# Patient Record
Sex: FEMALE | Race: WHITE
[De-identification: names, ages, dates, MRNs, and addresses within clinical notes are randomized per-mention and may not be internally consistent; named-entity substitution may affect disease eponyms.]

---

## 2017-09-03 ENCOUNTER — HEALTH MAINTENANCE LETTER (OUTPATIENT)
Age: 57
End: 2017-09-03

## 2018-10-10 ENCOUNTER — HOSPITAL ENCOUNTER (OUTPATIENT)
Age: 58
Discharge: HOME | End: 2018-10-10
Attending: FAMILY MEDICINE
Payer: COMMERCIAL

## 2018-10-10 ENCOUNTER — OFFICE VISIT (OUTPATIENT)
Dept: OBGYN CLINIC | Facility: CLINIC | Age: 58
End: 2018-10-10
Payer: COMMERCIAL

## 2018-10-10 VITALS
TEMPERATURE: 99 F | HEIGHT: 64 IN | HEART RATE: 115 BPM | SYSTOLIC BLOOD PRESSURE: 170 MMHG | BODY MASS INDEX: 35 KG/M2 | RESPIRATION RATE: 22 BRPM | DIASTOLIC BLOOD PRESSURE: 79 MMHG | OXYGEN SATURATION: 100 % | WEIGHT: 205 LBS

## 2018-10-10 VITALS
WEIGHT: 205 LBS | BODY MASS INDEX: 35 KG/M2 | HEIGHT: 64 IN | SYSTOLIC BLOOD PRESSURE: 140 MMHG | DIASTOLIC BLOOD PRESSURE: 88 MMHG

## 2018-10-10 DIAGNOSIS — N95.0 POSTMENOPAUSAL VAGINAL BLEEDING: Primary | ICD-10-CM

## 2018-10-10 DIAGNOSIS — N95.0 POSTMENOPAUSAL BLEEDING: Primary | ICD-10-CM

## 2018-10-10 DIAGNOSIS — Z12.39 SCREENING BREAST EXAMINATION: ICD-10-CM

## 2018-10-10 PROCEDURE — 99204 OFFICE O/P NEW MOD 45 MIN: CPT | Performed by: OBSTETRICS & GYNECOLOGY

## 2018-10-10 PROCEDURE — 99202 OFFICE O/P NEW SF 15 MIN: CPT

## 2018-10-10 PROCEDURE — 88305 TISSUE EXAM BY PATHOLOGIST: CPT | Performed by: OBSTETRICS & GYNECOLOGY

## 2018-10-10 RX ORDER — LEVOTHYROXINE SODIUM 0.05 MG/1
50 TABLET ORAL
COMMUNITY
End: 2018-10-29

## 2018-10-10 RX ORDER — QUINAPRIL 20 MG/1
20 TABLET ORAL NIGHTLY
COMMUNITY
End: 2018-10-25

## 2018-10-10 NOTE — PROGRESS NOTES
NEW GYN H&P     10/10/2018  3:42 PM    CC: Patient is here to establish care    HPI: Patient is a 62year old  LMP  referred from [address] [place] [address] for evaluation of acute onset heavy bright red vaginal bleeding.  Last gyne visit and PAP , reporting no gyne History      Marital status: Single      Spouse name: Not on file      Number of children: Not on file      Years of education: Not on file      Highest education level: Not on file    Social Needs      Financial resource strain: Not on file      Food inse Genitalia: bright red blood on perineum, no lesions or lacerations  Urethra: meatus normal   Bladder: well supported  Vagina: blood in vault, no lesions seen  Uterus:unable to palpate due to habitus  Cervix: blood at os  Adnexa: unable to palpate due to ha

## 2018-10-10 NOTE — ED PROVIDER NOTES
Patient Seen in: [address]    History   Patient presents with:  Vaginal Bleeding    Stated Complaint: Vaginal Bleeding     HPI    63 yo with 1 day h/o of vaginal bleeding.  States bleeding as though she was having a rocio and oriented to person, place, and time. Skin: Skin is warm and dry. Capillary refill takes less than 2 seconds. Psychiatric: She has a normal mood and affect. Her behavior is normal.   Nursing note and vitals reviewed.             ED Course   Labs Revi

## 2018-10-10 NOTE — ED NOTES
Pt discharged to gynecology department at [address] [place] [address] for same-day appointment. Avs reviewed and given to patient. Patient given list of names for PCP provider. Pt verbalized understanding and agreed.

## 2018-10-10 NOTE — ED INITIAL ASSESSMENT (HPI)
Pt here today for vaginal bleeding noted this morning. She reports 7 years since last menstrual cycle. She states she moved to [facility] from [place] about 1 year ago and has not established care with pcp or gyn.  Denied pelvic pain, pelvic pressure, low ba

## 2018-10-11 ENCOUNTER — ULTRASOUND ENCOUNTER (OUTPATIENT)
Dept: OBGYN CLINIC | Facility: CLINIC | Age: 58
End: 2018-10-11
Payer: COMMERCIAL

## 2018-10-11 DIAGNOSIS — N95.0 POSTMENOPAUSAL BLEEDING: ICD-10-CM

## 2018-10-11 PROCEDURE — 76830 TRANSVAGINAL US NON-OB: CPT | Performed by: OBSTETRICS & GYNECOLOGY

## 2018-10-11 PROCEDURE — 76856 US EXAM PELVIC COMPLETE: CPT | Performed by: OBSTETRICS & GYNECOLOGY

## 2018-10-15 ENCOUNTER — TELEPHONE (OUTPATIENT)
Dept: OBGYN CLINIC | Facility: CLINIC | Age: 58
End: 2018-10-15

## 2018-10-15 NOTE — TELEPHONE ENCOUNTER
Informed pt that her path report results have not been released yet by Dr. Joy Vazquez. Let pt know that once reviewed and released, pt will see it on her MyChart or receive a call. Voiced understanding.

## 2018-10-15 NOTE — TELEPHONE ENCOUNTER
Informed patient of benign EMB results showing proliferative EM. Recommend D&C to remove residual lining. Will call and schedule pre-op visit to plan surgery date.

## 2018-10-15 NOTE — TELEPHONE ENCOUNTER
Per Dr. Blossom Rendon, pts path report is WNL and she will contact her with the next steps. Let pt know.

## 2018-10-18 ENCOUNTER — TELEPHONE (OUTPATIENT)
Dept: OBGYN CLINIC | Facility: CLINIC | Age: 58
End: 2018-10-18

## 2018-10-18 ENCOUNTER — OFFICE VISIT (OUTPATIENT)
Dept: OBGYN CLINIC | Facility: CLINIC | Age: 58
End: 2018-10-18
Payer: COMMERCIAL

## 2018-10-18 VITALS
DIASTOLIC BLOOD PRESSURE: 88 MMHG | SYSTOLIC BLOOD PRESSURE: 142 MMHG | WEIGHT: 212.31 LBS | BODY MASS INDEX: 36.25 KG/M2 | HEIGHT: 64 IN

## 2018-10-18 DIAGNOSIS — E66.9 OBESITY, CLASS II, BMI 35-39.9: ICD-10-CM

## 2018-10-18 DIAGNOSIS — N95.0 POSTMENOPAUSAL BLEEDING: Primary | ICD-10-CM

## 2018-10-18 DIAGNOSIS — Z01.818 VISIT FOR PRE-OPERATIVE EXAMINATION: ICD-10-CM

## 2018-10-18 PROBLEM — E66.812 OBESITY, CLASS II, BMI 35-39.9: Status: ACTIVE | Noted: 2018-10-18

## 2018-10-18 PROCEDURE — 99214 OFFICE O/P EST MOD 30 MIN: CPT | Performed by: OBSTETRICS & GYNECOLOGY

## 2018-10-18 RX ORDER — MISOPROSTOL 200 UG/1
200 TABLET ORAL
Qty: 1 TABLET | Refills: 0 | Status: SHIPPED | OUTPATIENT
Start: 2018-10-18 | End: 2018-10-19

## 2018-10-18 RX ORDER — MISOPROSTOL 200 UG/1
200 TABLET ORAL
Qty: 1 TABLET | Refills: 0 | Status: SHIPPED | OUTPATIENT
Start: 2018-10-18 | End: 2018-10-18

## 2018-10-18 RX ORDER — LORAZEPAM 1 MG/1
1 TABLET ORAL NIGHTLY
Qty: 1 TABLET | Refills: 0 | Status: SHIPPED | OUTPATIENT
Start: 2018-10-18 | End: 2018-10-19

## 2018-10-18 NOTE — TELEPHONE ENCOUNTER
Pt states [facility] will not fill 1 tablet for Cytotec allowed only 60 pills. Per [facility] send to a different pharmacy other then [facility] to see if they can fill 1 tablet.  Pt asking to have sent to [facility] in [place] located in [address]

## 2018-10-18 NOTE — TELEPHONE ENCOUNTER
Pt having a difficult time [facility] pharmacy please cancel rx for lorazepam and send over to [facility] in [address]. Pt asking if we can remove [facility] out of her chart.

## 2018-10-18 NOTE — H&P (VIEW-ONLY)
[address], [facility]    History & Physical    Stevie Lott Patient Status:  No patient class for patient encounter    1960 MRN [id number]   Location [address] [place] frequency  Hematological/lymphatic: no complaints  Breast: denies rashes, skin changes, pain, lumps or discharge   Psychiatric: no complaints  Endocrine:no complaints  Neurological: no complaints  Immunological: no complaints  Musculoskeletal:no complaints

## 2018-10-18 NOTE — H&P
[address], [facility]    History & Physical    Reji Chang [facility] Patient Status:  No patient class for patient encounter    1960 MRN [id number]   Location [address] [place] frequency  Hematological/lymphatic: no complaints  Breast: denies rashes, skin changes, pain, lumps or discharge   Psychiatric: no complaints  Endocrine:no complaints  Neurological: no complaints  Immunological: no complaints  Musculoskeletal:no complaints

## 2018-10-25 ENCOUNTER — OFFICE VISIT (OUTPATIENT)
Dept: FAMILY MEDICINE CLINIC | Facility: CLINIC | Age: 58
End: 2018-10-25
Payer: COMMERCIAL

## 2018-10-25 VITALS
OXYGEN SATURATION: 98 % | HEART RATE: 109 BPM | DIASTOLIC BLOOD PRESSURE: 88 MMHG | HEIGHT: 64 IN | WEIGHT: 213 LBS | SYSTOLIC BLOOD PRESSURE: 138 MMHG | BODY MASS INDEX: 36.37 KG/M2

## 2018-10-25 DIAGNOSIS — Z23 NEED FOR VACCINATION: ICD-10-CM

## 2018-10-25 DIAGNOSIS — Z11.59 ENCOUNTER FOR HEPATITIS C SCREENING TEST FOR LOW RISK PATIENT: ICD-10-CM

## 2018-10-25 DIAGNOSIS — E03.9 HYPOTHYROIDISM, UNSPECIFIED TYPE: ICD-10-CM

## 2018-10-25 DIAGNOSIS — I10 ESSENTIAL HYPERTENSION: ICD-10-CM

## 2018-10-25 DIAGNOSIS — Z00.01 ENCOUNTER FOR ROUTINE ADULT HEALTH EXAMINATION WITH ABNORMAL FINDINGS: Primary | ICD-10-CM

## 2018-10-25 DIAGNOSIS — E11.9 TYPE 2 DIABETES MELLITUS WITHOUT COMPLICATION, WITHOUT LONG-TERM CURRENT USE OF INSULIN (HCC): ICD-10-CM

## 2018-10-25 PROBLEM — R73.03 PREDIABETES: Status: ACTIVE | Noted: 2018-10-25

## 2018-10-25 PROCEDURE — 90471 IMMUNIZATION ADMIN: CPT | Performed by: FAMILY MEDICINE

## 2018-10-25 PROCEDURE — 99386 PREV VISIT NEW AGE 40-64: CPT | Performed by: FAMILY MEDICINE

## 2018-10-25 PROCEDURE — 90686 IIV4 VACC NO PRSV 0.5 ML IM: CPT | Performed by: FAMILY MEDICINE

## 2018-10-25 RX ORDER — QUINAPRIL 20 MG/1
20 TABLET ORAL NIGHTLY
Qty: 90 TABLET | Refills: 1 | Status: SHIPPED | OUTPATIENT
Start: 2018-10-25 | End: 2019-04-16

## 2018-10-25 NOTE — PROGRESS NOTES
HPI:   Cher Reynolds is a 62year old female who presents for a complete physical exam.   Patient presents with:  Establish Care  Physical  Medication Request    Diagnosed with diabetes or pre-diabetes a few years ago, but states her A1C has never been abov possible reaction   Past Medical History:   Diagnosis Date   • Diabetes [facility])    • Essential hypertension    • History of PCOS    • Post-menopausal bleeding    • Post-menopause 2011   • Thyroid disease     hypothyroidism      Past Surgical History:   Pro examination with abnormal findings  (primary encounter diagnosis)  Hypothyroidism, unspecified type  Type 2 diabetes mellitus without complication, without long-term current use of insulin (hcc)  Essential hypertension  Encounter for hepatitis c screening Checked today      All questions were answered during the visit and the patient verbalizes understanding. She will return in 3-6 months for follow-up of HTN and DM, one year for next [address] or sooner as needed.     Meds & Refills for this Visit:  Requested

## 2018-10-25 NOTE — PATIENT INSTRUCTIONS
-Start Vitamin D3 2,000 IU daily, and await results of blood work   Prevention Guidelines, Women Ages 48 to 59  Screening tests and vaccines are an important part of managing your health.  A screening test is done to find possible disorders or diseases in Cervical cancer All women in this age group, except women who have had a complete hysterectomy Pap test every 3 years or Pap test with human papillomavirus (HPV) test every 5 years   Chlamydia Women at increased risk for infection At routine exams   Colore Hepatitis B Women at increased risk for infection – talk with your healthcare provider 3 doses over 6 months; second dose should be given 1 month after the first dose; the third dose should be given at least 2 months after the second dose and at least 4 mo Use of daily aspirin Women ages 54 and up in this age group who are at risk for cardiovascular health problems such as stroke When your risk is known   Use of tobacco and the health effects it can cause All women in this age group Every exam   [address]

## 2018-10-27 ENCOUNTER — LAB ENCOUNTER (OUTPATIENT)
Dept: LAB | Facility: REFERENCE LAB | Age: 58
End: 2018-10-27
Attending: FAMILY MEDICINE
Payer: COMMERCIAL

## 2018-10-27 DIAGNOSIS — Z11.59 ENCOUNTER FOR HEPATITIS C SCREENING TEST FOR LOW RISK PATIENT: ICD-10-CM

## 2018-10-27 DIAGNOSIS — E11.9 TYPE 2 DIABETES MELLITUS WITHOUT COMPLICATION, WITHOUT LONG-TERM CURRENT USE OF INSULIN (HCC): ICD-10-CM

## 2018-10-27 DIAGNOSIS — E03.9 HYPOTHYROIDISM, UNSPECIFIED TYPE: ICD-10-CM

## 2018-10-27 DIAGNOSIS — Z00.01 ENCOUNTER FOR ROUTINE ADULT HEALTH EXAMINATION WITH ABNORMAL FINDINGS: ICD-10-CM

## 2018-10-27 PROCEDURE — 82570 ASSAY OF URINE CREATININE: CPT

## 2018-10-27 PROCEDURE — 83036 HEMOGLOBIN GLYCOSYLATED A1C: CPT

## 2018-10-27 PROCEDURE — 85025 COMPLETE CBC W/AUTO DIFF WBC: CPT

## 2018-10-27 PROCEDURE — 80061 LIPID PANEL: CPT

## 2018-10-27 PROCEDURE — 82043 UR ALBUMIN QUANTITATIVE: CPT

## 2018-10-27 PROCEDURE — 80053 COMPREHEN METABOLIC PANEL: CPT

## 2018-10-27 PROCEDURE — 36415 COLL VENOUS BLD VENIPUNCTURE: CPT

## 2018-10-27 PROCEDURE — 84443 ASSAY THYROID STIM HORMONE: CPT

## 2018-10-27 PROCEDURE — 86803 HEPATITIS C AB TEST: CPT

## 2018-10-29 RX ORDER — LEVOTHYROXINE SODIUM 0.05 MG/1
50 TABLET ORAL
Qty: 90 TABLET | Refills: 3 | Status: SHIPPED | OUTPATIENT
Start: 2018-10-29 | End: 2019-10-22

## 2018-10-31 RX ORDER — ESOMEPRAZOLE MAGNESIUM 20 MG/1
20 FOR SUSPENSION ORAL
COMMUNITY
End: 2020-01-15 | Stop reason: ALTCHOICE

## 2018-10-31 RX ORDER — MULTIVIT-MIN/IRON/FOLIC ACID/K 18-600-40
1 CAPSULE ORAL DAILY
COMMUNITY

## 2018-11-02 ENCOUNTER — TELEPHONE (OUTPATIENT)
Dept: OBGYN CLINIC | Facility: CLINIC | Age: 58
End: 2018-11-02

## 2018-11-02 NOTE — TELEPHONE ENCOUNTER
Needs to know when she should be taking her medication prescribed prior to sugery. Okay to leave message on voicemail.

## 2018-11-02 NOTE — TELEPHONE ENCOUNTER
Left VM for pt stating if pt was referring to Cytotec, it needs to be taken night before surgery and to call with any additional questions

## 2018-11-06 ENCOUNTER — ANESTHESIA EVENT (OUTPATIENT)
Dept: SURGERY | Facility: HOSPITAL | Age: 58
End: 2018-11-06
Payer: COMMERCIAL

## 2018-11-06 ENCOUNTER — HOSPITAL ENCOUNTER (OUTPATIENT)
Facility: HOSPITAL | Age: 58
Setting detail: HOSPITAL OUTPATIENT SURGERY
Discharge: HOME | End: 2018-11-06
Attending: OBSTETRICS & GYNECOLOGY | Admitting: OBSTETRICS & GYNECOLOGY
Payer: COMMERCIAL

## 2018-11-06 ENCOUNTER — ANESTHESIA (OUTPATIENT)
Dept: SURGERY | Facility: HOSPITAL | Age: 58
End: 2018-11-06
Payer: COMMERCIAL

## 2018-11-06 VITALS
SYSTOLIC BLOOD PRESSURE: 122 MMHG | RESPIRATION RATE: 18 BRPM | DIASTOLIC BLOOD PRESSURE: 73 MMHG | HEIGHT: 63 IN | BODY MASS INDEX: 36.86 KG/M2 | HEART RATE: 78 BPM | TEMPERATURE: 98 F | WEIGHT: 208 LBS | OXYGEN SATURATION: 97 %

## 2018-11-06 PROCEDURE — 0UDB8ZX EXTRACTION OF ENDOMETRIUM, VIA NATURAL OR ARTIFICIAL OPENING ENDOSCOPIC, DIAGNOSTIC: ICD-10-PCS | Performed by: OBSTETRICS & GYNECOLOGY

## 2018-11-06 PROCEDURE — 58558 HYSTEROSCOPY BIOPSY: CPT | Performed by: OBSTETRICS & GYNECOLOGY

## 2018-11-06 RX ORDER — ONDANSETRON 2 MG/ML
4 INJECTION INTRAMUSCULAR; INTRAVENOUS EVERY 8 HOURS PRN
Status: DISCONTINUED | OUTPATIENT
Start: 2018-11-06 | End: 2018-11-06

## 2018-11-06 RX ORDER — LIDOCAINE HYDROCHLORIDE 10 MG/ML
INJECTION, SOLUTION EPIDURAL; INFILTRATION; INTRACAUDAL; PERINEURAL AS NEEDED
Status: DISCONTINUED | OUTPATIENT
Start: 2018-11-06 | End: 2018-11-06 | Stop reason: SURG

## 2018-11-06 RX ORDER — ONDANSETRON 2 MG/ML
4 INJECTION INTRAMUSCULAR; INTRAVENOUS ONCE AS NEEDED
Status: DISCONTINUED | OUTPATIENT
Start: 2018-11-06 | End: 2018-11-06

## 2018-11-06 RX ORDER — HYDROCODONE BITARTRATE AND ACETAMINOPHEN 5; 325 MG/1; MG/1
1 TABLET ORAL AS NEEDED
Status: DISCONTINUED | OUTPATIENT
Start: 2018-11-06 | End: 2018-11-06

## 2018-11-06 RX ORDER — HYDROCODONE BITARTRATE AND ACETAMINOPHEN 5; 325 MG/1; MG/1
2 TABLET ORAL AS NEEDED
Status: DISCONTINUED | OUTPATIENT
Start: 2018-11-06 | End: 2018-11-06

## 2018-11-06 RX ORDER — MIDAZOLAM HYDROCHLORIDE 1 MG/ML
INJECTION INTRAMUSCULAR; INTRAVENOUS AS NEEDED
Status: DISCONTINUED | OUTPATIENT
Start: 2018-11-06 | End: 2018-11-06 | Stop reason: SURG

## 2018-11-06 RX ORDER — ONDANSETRON 2 MG/ML
INJECTION INTRAMUSCULAR; INTRAVENOUS AS NEEDED
Status: DISCONTINUED | OUTPATIENT
Start: 2018-11-06 | End: 2018-11-06 | Stop reason: SURG

## 2018-11-06 RX ORDER — SODIUM CHLORIDE, SODIUM LACTATE, POTASSIUM CHLORIDE, CALCIUM CHLORIDE 600; 310; 30; 20 MG/100ML; MG/100ML; MG/100ML; MG/100ML
INJECTION, SOLUTION INTRAVENOUS CONTINUOUS
Status: DISCONTINUED | OUTPATIENT
Start: 2018-11-06 | End: 2018-11-06

## 2018-11-06 RX ORDER — MORPHINE SULFATE 10 MG/ML
6 INJECTION, SOLUTION INTRAMUSCULAR; INTRAVENOUS EVERY 10 MIN PRN
Status: DISCONTINUED | OUTPATIENT
Start: 2018-11-06 | End: 2018-11-06

## 2018-11-06 RX ORDER — FAMOTIDINE 20 MG/1
20 TABLET ORAL ONCE
Status: DISCONTINUED | OUTPATIENT
Start: 2018-11-06 | End: 2018-11-06 | Stop reason: HOSPADM

## 2018-11-06 RX ORDER — KETOROLAC TROMETHAMINE 30 MG/ML
INJECTION, SOLUTION INTRAMUSCULAR; INTRAVENOUS AS NEEDED
Status: DISCONTINUED | OUTPATIENT
Start: 2018-11-06 | End: 2018-11-06 | Stop reason: SURG

## 2018-11-06 RX ORDER — MORPHINE SULFATE 4 MG/ML
2 INJECTION, SOLUTION INTRAMUSCULAR; INTRAVENOUS EVERY 10 MIN PRN
Status: DISCONTINUED | OUTPATIENT
Start: 2018-11-06 | End: 2018-11-06

## 2018-11-06 RX ORDER — NITROFURANTOIN 25; 75 MG/1; MG/1
100 CAPSULE ORAL 2 TIMES DAILY
Status: DISCONTINUED | OUTPATIENT
Start: 2018-11-06 | End: 2018-11-06

## 2018-11-06 RX ORDER — DEXAMETHASONE SODIUM PHOSPHATE 4 MG/ML
VIAL (ML) INJECTION AS NEEDED
Status: DISCONTINUED | OUTPATIENT
Start: 2018-11-06 | End: 2018-11-06 | Stop reason: SURG

## 2018-11-06 RX ORDER — ACETAMINOPHEN 500 MG
1000 TABLET ORAL ONCE
Status: COMPLETED | OUTPATIENT
Start: 2018-11-06 | End: 2018-11-06

## 2018-11-06 RX ORDER — LORAZEPAM 1 MG/1
1 TABLET ORAL ONCE
COMMUNITY
End: 2018-11-16 | Stop reason: ALTCHOICE

## 2018-11-06 RX ORDER — DEXTROSE MONOHYDRATE 25 G/50ML
50 INJECTION, SOLUTION INTRAVENOUS
Status: DISCONTINUED | OUTPATIENT
Start: 2018-11-06 | End: 2018-11-06

## 2018-11-06 RX ORDER — ONDANSETRON 4 MG/1
4 TABLET, FILM COATED ORAL EVERY 8 HOURS PRN
Status: DISCONTINUED | OUTPATIENT
Start: 2018-11-06 | End: 2018-11-06

## 2018-11-06 RX ORDER — NALOXONE HYDROCHLORIDE 0.4 MG/ML
80 INJECTION, SOLUTION INTRAMUSCULAR; INTRAVENOUS; SUBCUTANEOUS AS NEEDED
Status: DISCONTINUED | OUTPATIENT
Start: 2018-11-06 | End: 2018-11-06

## 2018-11-06 RX ORDER — METOCLOPRAMIDE 10 MG/1
10 TABLET ORAL ONCE
Status: COMPLETED | OUTPATIENT
Start: 2018-11-06 | End: 2018-11-06

## 2018-11-06 RX ORDER — MORPHINE SULFATE 4 MG/ML
4 INJECTION, SOLUTION INTRAMUSCULAR; INTRAVENOUS EVERY 10 MIN PRN
Status: DISCONTINUED | OUTPATIENT
Start: 2018-11-06 | End: 2018-11-06

## 2018-11-06 RX ORDER — MISOPROSTOL 200 UG/1
200 TABLET ORAL ONCE
COMMUNITY
End: 2018-11-16 | Stop reason: ALTCHOICE

## 2018-11-06 RX ADMIN — KETOROLAC TROMETHAMINE 30 MG: 30 INJECTION, SOLUTION INTRAMUSCULAR; INTRAVENOUS at 08:10:00

## 2018-11-06 RX ADMIN — LIDOCAINE HYDROCHLORIDE 50 MG: 10 INJECTION, SOLUTION EPIDURAL; INFILTRATION; INTRACAUDAL; PERINEURAL at 07:50:00

## 2018-11-06 RX ADMIN — SODIUM CHLORIDE, SODIUM LACTATE, POTASSIUM CHLORIDE, CALCIUM CHLORIDE: 600; 310; 30; 20 INJECTION, SOLUTION INTRAVENOUS at 08:26:00

## 2018-11-06 RX ADMIN — ONDANSETRON 4 MG: 2 INJECTION INTRAMUSCULAR; INTRAVENOUS at 08:10:00

## 2018-11-06 RX ADMIN — MIDAZOLAM HYDROCHLORIDE 2 MG: 1 INJECTION INTRAMUSCULAR; INTRAVENOUS at 07:44:00

## 2018-11-06 RX ADMIN — SODIUM CHLORIDE, SODIUM LACTATE, POTASSIUM CHLORIDE, CALCIUM CHLORIDE: 600; 310; 30; 20 INJECTION, SOLUTION INTRAVENOUS at 07:46:00

## 2018-11-06 RX ADMIN — DEXAMETHASONE SODIUM PHOSPHATE 4 MG: 4 MG/ML VIAL (ML) INJECTION at 07:58:00

## 2018-11-06 NOTE — INTERVAL H&P NOTE
Pre-op Diagnosis: Post menopausal bleeding    The above referenced H&P was reviewed by Nissa Chairez MD on 11/6/2018, the patient was examined and no significant changes have occurred in the patient's condition since the H&P was performed.   I discussed

## 2018-11-06 NOTE — ANESTHESIA PROCEDURE NOTES
ANESTHESIA INTUBATION  Date/Time: 11/6/2018 7:53 AM  Urgency: elective    Airway not difficult    General Information and Staff    Patient location during procedure: OR  Anesthesiologist: Lita [place] MD  Resident/CRNA: Rashawn Ferraro CRNA Pe

## 2018-11-06 NOTE — ANESTHESIA PREPROCEDURE EVALUATION
Anesthesia PreOp Note    HPI:     Krystle [place] is a 62year old female who presents for preoperative consultation requested by: Misty Fitzpatrick MD    Date of Surgery: 11/6/2018    Procedure(s):   HYSTEROSCOPY DILATION AND CURETTAGE  Indication: Post lashonda (Patient taking differently: Take 20 mg by mouth daily.  ) Disp: 90 tablet Rfl: 1 11/5/2018 at 0530   MetFORMIN HCl 500 MG Oral Tab Take 500 mg by mouth 2 (two) times daily with meals.    Disp:  Rfl:  Past Month at Unknown time       Current Facility-Admini Not on file    Tobacco Use      Smoking status: Never Smoker      Smokeless tobacco: Never Used    Substance and Sexual Activity      Alcohol use: No        Frequency: Never        Comment: rarely      Drug use: No      Sexual activity: Yes        Charly anesthetic complications   Airway   Mallampati: II  Neck ROM: full  Dental - normal exam     Pulmonary - negative ROS and normal exam   Cardiovascular - negative ROS and normal exam  (+) hypertension,     Neuro/Psych - negative ROS     GI/Hepatic/Renal - n

## 2018-11-06 NOTE — ANESTHESIA POSTPROCEDURE EVALUATION
Patient: Clement Shearing    Procedure Summary     Date:  11/06/18 Room / Location:  [facility] OR 03 / [facility] OR    Anesthesia Start:  1254 Anesthesia Stop:  8553    Procedure:  HYSTEROSCOPY DILATION AND CURETTAGE (N/A ) Diagnosis:  (Post menopausal bleeding)

## 2018-11-06 NOTE — OPERATIVE REPORT
[facility] OPERATING ROOM  Operative Note     Marina Michael Location: OR   [facility] [id number] N [id number]   Admission Date 11/6/2018 Operation Date 11/6/2018   Attending Physician Elisha Hook MD Operating Physician MD Maegan Lowry

## 2018-11-06 NOTE — OPERATIVE REPORT
[facility]    PATIENT'S NAME: Bridger Geovanni   ATTENDING PHYSICIAN: Adonay Rodriguez MD   OPERATING PHYSICIAN: Adonay Rodriguez MD   PATIENT ACCOUNT#:   [de-identified]    LOCATION:  [place] [address]  MEDICAL RECORD #:   [id number]       DATE OF BIR She was transferred stable to Recovery, having tolerated the procedure well.     Dictated By Dalila Maya MD  d: 11/06/2018 08:37:19  t: 11/06/2018 09:32:25  [facility] [id number]  [place]/

## 2018-11-16 ENCOUNTER — OFFICE VISIT (OUTPATIENT)
Dept: OBGYN CLINIC | Facility: CLINIC | Age: 58
End: 2018-11-16
Payer: COMMERCIAL

## 2018-11-16 VITALS
DIASTOLIC BLOOD PRESSURE: 90 MMHG | HEIGHT: 64 IN | WEIGHT: 213 LBS | SYSTOLIC BLOOD PRESSURE: 140 MMHG | BODY MASS INDEX: 36.37 KG/M2

## 2018-11-16 DIAGNOSIS — Z98.890 POST-OPERATIVE STATE: Primary | ICD-10-CM

## 2018-11-16 PROCEDURE — 99213 OFFICE O/P EST LOW 20 MIN: CPT | Performed by: OBSTETRICS & GYNECOLOGY

## 2018-11-16 NOTE — PROGRESS NOTES
Andres Browning is a 62year old female. HPI:   Patient presents with:  Surgical Followup: Diagnostic hysteroscopy with dilation and curettage done 11/06/2018    Doing well since HSC/D&C 11/6/18.  Operative and surgical Path findings reviewed = 2.5 cm aggre HIVES  Sulfa Antibiotics       OTHER (SEE COMMENTS), HIVES    Comment:She states she was informed by a physician of             possible reaction    /90   Ht 64\"   Wt 213 lb   LMP 10/10/2011 (Approximate)   BMI 36.56 kg/m²      ASSESSMENT

## 2018-11-29 ENCOUNTER — TELEPHONE (OUTPATIENT)
Dept: OBGYN CLINIC | Facility: CLINIC | Age: 58
End: 2018-11-29

## 2018-11-29 NOTE — TELEPHONE ENCOUNTER
Spoke to pt who stated that her \"urine is not clear\" though it is \"not cloudy\" and she denies any sx of infection. No pain with urination, no urgency and no blood in the urine.  Offered pt an appt, pt stated that she will increase her fluids, as she adm

## 2018-12-07 ENCOUNTER — HOSPITAL ENCOUNTER (OUTPATIENT)
Dept: MAMMOGRAPHY | Age: 58
Discharge: HOME | End: 2018-12-07
Attending: OBSTETRICS & GYNECOLOGY
Payer: COMMERCIAL

## 2018-12-07 DIAGNOSIS — Z12.39 SCREENING BREAST EXAMINATION: ICD-10-CM

## 2018-12-07 PROCEDURE — 77063 BREAST TOMOSYNTHESIS BI: CPT | Performed by: OBSTETRICS & GYNECOLOGY

## 2018-12-07 PROCEDURE — 77067 SCR MAMMO BI INCL CAD: CPT | Performed by: OBSTETRICS & GYNECOLOGY

## 2019-04-16 RX ORDER — QUINAPRIL 20 MG/1
TABLET ORAL
Qty: 90 TABLET | Refills: 1 | Status: SHIPPED | OUTPATIENT
Start: 2019-04-16 | End: 2019-09-25

## 2019-08-01 ENCOUNTER — TELEPHONE (OUTPATIENT)
Dept: OBGYN CLINIC | Facility: CLINIC | Age: 59
End: 2019-08-01

## 2019-08-01 NOTE — TELEPHONE ENCOUNTER
Per Health Maintenance patient is due for pap smear. Lm on voicemail for pt to call back to schedule annual exam/pap smear appointment.

## 2019-08-02 ENCOUNTER — TELEPHONE (OUTPATIENT)
Dept: OBGYN CLINIC | Facility: CLINIC | Age: 59
End: 2019-08-02

## 2019-08-02 NOTE — TELEPHONE ENCOUNTER
Pt stating she does not believe she is due for visit with EB till October. Pt would like to schedule annual exam and Pap (last Pap was in 2011, pt confirmed) in October and declined to schedule it today with this RN. Pt will call back when she has her calendar handy. Pt verbalized understanding and agrees with POC.

## 2019-09-25 RX ORDER — QUINAPRIL 20 MG/1
TABLET ORAL
Qty: 60 TABLET | Refills: 0 | Status: SHIPPED | OUTPATIENT
Start: 2019-09-25 | End: 2019-11-15

## 2019-10-01 NOTE — TELEPHONE ENCOUNTER
Tereso Lincoln, DO [place] [address] Dr. Mary Alice Iniguez 6 days ago      Please call to schedule BP follow-up/physical with me in the next month as she is overdue.       LMTCB to make f/u appt

## 2019-10-22 RX ORDER — LEVOTHYROXINE SODIUM 0.05 MG/1
50 TABLET ORAL
Qty: 30 TABLET | Refills: 11 | Status: SHIPPED | OUTPATIENT
Start: 2019-10-22 | End: 2020-01-15

## 2019-11-07 ENCOUNTER — HEALTH MAINTENANCE LETTER (OUTPATIENT)
Age: 59
End: 2019-11-07

## 2019-11-15 RX ORDER — QUINAPRIL 20 MG/1
TABLET ORAL
Qty: 30 TABLET | Refills: 1 | Status: SHIPPED | OUTPATIENT
Start: 2019-11-15 | End: 2020-01-15

## 2019-11-15 NOTE — TELEPHONE ENCOUNTER
A refill request was received for:  Requested Prescriptions     Pending Prescriptions Disp Refills   • QUINAPRIL HCL 20 MG Oral Tab [Pharmacy Med Name: QUINAPRIL 20 MG TABLET] 30 tablet 1     Sig: TAKE 1 TABLET BY MOUTH EVERY DAY EVERY NIGHT     Last refil

## 2019-11-18 ENCOUNTER — TELEPHONE (OUTPATIENT)
Dept: FAMILY MEDICINE CLINIC | Facility: CLINIC | Age: 59
End: 2019-11-18

## 2019-11-18 NOTE — TELEPHONE ENCOUNTER
Pt calling calling back unable to come in for sooner appointment, but has enough medication to last her the year

## 2019-11-25 ENCOUNTER — OFFICE VISIT (OUTPATIENT)
Dept: FAMILY MEDICINE CLINIC | Facility: CLINIC | Age: 59
End: 2019-11-25
Payer: COMMERCIAL

## 2019-11-25 VITALS
OXYGEN SATURATION: 98 % | DIASTOLIC BLOOD PRESSURE: 78 MMHG | BODY MASS INDEX: 35.85 KG/M2 | WEIGHT: 210 LBS | TEMPERATURE: 99 F | SYSTOLIC BLOOD PRESSURE: 168 MMHG | HEIGHT: 64 IN | HEART RATE: 88 BPM | RESPIRATION RATE: 16 BRPM

## 2019-11-25 DIAGNOSIS — J04.0 LARYNGITIS: ICD-10-CM

## 2019-11-25 DIAGNOSIS — J06.9 UPPER RESPIRATORY TRACT INFECTION, UNSPECIFIED TYPE: Primary | ICD-10-CM

## 2019-11-25 DIAGNOSIS — I10 ESSENTIAL HYPERTENSION: ICD-10-CM

## 2019-11-25 PROCEDURE — 99212 OFFICE O/P EST SF 10 MIN: CPT | Performed by: NURSE PRACTITIONER

## 2019-11-25 NOTE — PATIENT INSTRUCTIONS
Viral Upper Respiratory Illness (Adult)    You have a viral upper respiratory illness (URI), which is another term for the common cold. This illness is contagious during the first few days. It is spread through the air by coughing and sneezing.  It may al · Over-the-counter cold medicines will not shorten the length of time you’re sick, but they may be helpful for the following symptoms: cough, sore throat, and nasal and sinus congestion.  If you take prescription medicines, ask your healthcare provider or p · Moist air may help your symptoms. Try breathing cool steam from a humidifier or vaporizer. Or breathe air from a steamy shower. · Drink plenty of fluids to stay well hydrated.   · Do not smoke  Follow-up care  Follow up with your healthcare provider or t

## 2019-11-25 NOTE — PROGRESS NOTES
CHIEF COMPLAINT:   Patient presents with:  URI: X 5 days, laryngitis. No fevers, dry cough, clear and morning a little green cough. or at night      HPI:   Krystle [place] is a 61year old female who presents for upper respiratory symptoms for  5 days.  Kassi Comment: rarely    Drug use: No        REVIEW OF SYSTEMS:   GENERAL: good appetite  SKIN: no rashes or abnormal skin lesions  HEENT: See HPI  LUNGS: denies shortness of breath or wheezing, See HPI  CARDIOVASCULAR: denies chest pain or palpitations   GI Discussed s/s of worsening infection/condition with Patient and importance of prompt medical re-evaluation including when to seek emergency care.  Patient voiced understanding    Increase fluids and rest. Increase humidity and steam, warm salt water gargles · If symptoms are severe, rest at home for the first 2 to 3 days. When you resume activity, don't let yourself get too tired. · Don't smoke. If you need help stopping, talk with your healthcare provider.   · Avoid being exposed to cigarette smoke (yours or Date Last Reviewed: 6/1/2018  © 9949-3689 [employer] [address]. [address] [facility], [address] [place]. All rights reserved. This information is not intended as a substitute for professional medical care.  Always follow your healthcare professional'

## 2019-11-26 ENCOUNTER — OFFICE VISIT (OUTPATIENT)
Dept: FAMILY MEDICINE CLINIC | Facility: CLINIC | Age: 59
End: 2019-11-26
Payer: COMMERCIAL

## 2019-11-26 VITALS
HEART RATE: 107 BPM | SYSTOLIC BLOOD PRESSURE: 150 MMHG | WEIGHT: 210 LBS | TEMPERATURE: 99 F | DIASTOLIC BLOOD PRESSURE: 88 MMHG | OXYGEN SATURATION: 98 % | HEIGHT: 64 IN | BODY MASS INDEX: 35.85 KG/M2

## 2019-11-26 DIAGNOSIS — I10 ESSENTIAL HYPERTENSION: Primary | ICD-10-CM

## 2019-11-26 PROCEDURE — 99213 OFFICE O/P EST LOW 20 MIN: CPT | Performed by: FAMILY MEDICINE

## 2019-11-26 NOTE — PROGRESS NOTES
HPI:    Patient ID: Rani Haider is a 61year old female who presents for follow-up of elevated blood pressure reading at urgent care yesterday. Patient takes her medicines religiously.   She denies chest pain, chest tightness, shortness of breath, new cou Attends meetings of clubs or organizations: Not on file        Relationship status: Not on file      Intimate partner violence:        Fear of current or ex partner: Not on file        Emotionally abused: Not on file        Physically abused: Not on file Ht 64\"   Wt 210 lb (95.3 kg)   LMP 10/10/2011 (Approximate)   SpO2 98%   BMI 36.05 kg/m²     PHYSICAL EXAM:   Physical Exam  Blood pressure right upper extremity supine 124/72  Blood pressure left upper extremity supine 118/70.   Heart: PMI nondisplaced,

## 2020-01-15 ENCOUNTER — OFFICE VISIT (OUTPATIENT)
Dept: FAMILY MEDICINE CLINIC | Facility: CLINIC | Age: 60
End: 2020-01-15
Payer: COMMERCIAL

## 2020-01-15 VITALS
SYSTOLIC BLOOD PRESSURE: 138 MMHG | BODY MASS INDEX: 36.7 KG/M2 | DIASTOLIC BLOOD PRESSURE: 88 MMHG | HEART RATE: 112 BPM | WEIGHT: 215 LBS | OXYGEN SATURATION: 98 % | HEIGHT: 64 IN

## 2020-01-15 DIAGNOSIS — E03.9 HYPOTHYROIDISM, UNSPECIFIED TYPE: ICD-10-CM

## 2020-01-15 DIAGNOSIS — Z12.31 SCREENING MAMMOGRAM, ENCOUNTER FOR: ICD-10-CM

## 2020-01-15 DIAGNOSIS — Z00.01 ENCOUNTER FOR ROUTINE ADULT HEALTH EXAMINATION WITH ABNORMAL FINDINGS: Primary | ICD-10-CM

## 2020-01-15 DIAGNOSIS — E55.9 VITAMIN D DEFICIENCY: ICD-10-CM

## 2020-01-15 DIAGNOSIS — R73.03 PREDIABETES: ICD-10-CM

## 2020-01-15 DIAGNOSIS — Z12.11 COLON CANCER SCREENING: ICD-10-CM

## 2020-01-15 DIAGNOSIS — I10 ESSENTIAL HYPERTENSION: ICD-10-CM

## 2020-01-15 PROBLEM — Z01.818 VISIT FOR PRE-OPERATIVE EXAMINATION: Status: RESOLVED | Noted: 2018-10-18 | Resolved: 2020-01-15

## 2020-01-15 PROCEDURE — 99396 PREV VISIT EST AGE 40-64: CPT | Performed by: FAMILY MEDICINE

## 2020-01-15 RX ORDER — QUINAPRIL 40 MG/1
40 TABLET ORAL DAILY
Qty: 90 TABLET | Refills: 1 | Status: SHIPPED | OUTPATIENT
Start: 2020-01-15 | End: 2020-07-07

## 2020-01-15 RX ORDER — LEVOTHYROXINE SODIUM 0.05 MG/1
50 TABLET ORAL
Qty: 90 TABLET | Refills: 0 | Status: SHIPPED | OUTPATIENT
Start: 2020-01-15 | End: 2020-03-17

## 2020-01-15 NOTE — PROGRESS NOTES
HPI:   Keyonna Luong is a 61year old female who presents for a complete physical exam.     Last pap: 2016 and normal  Last mammogram: 12/2018 and normal, due for repeat pap smear    Menses: Post-menopausal    Previous colonoscopy: 2010, due to repeat this HIVES  Sulfa Antibiotics       OTHER (SEE COMMENTS), HIVES    Comment:She states she was informed by a physician of             possible reaction   Past Medical History:   Diagnosis Date   • Diabetes ([facility] [address]     diagnosed 2008   • Essential hypertension    • clear to auscultation  CARDIO: RRR without murmur  GI: good bowel sounds, no masses, HSM or tenderness  : Deferred, plans to see Dr. Sanchez Gallus: no edema, right 3rd and 4th DIP with Heberden's nodes     Cholesterol, Total (mg/dL)   Date Value a day most days of the week   -Diabetes screening which she desires  -Cholesterol screening which she desires  -Risks and benefits of Aspirin to prevent heart attacks and colon cancer   -Recommendation for yearly influenza vaccine  -Need for Tdap once as a

## 2020-01-15 NOTE — PATIENT INSTRUCTIONS
-Try Turmeric 1,000 mg twice a day for arthritis  Low-Salt Choices  Eating salt (sodium) can make your body retain too much water. Excess water makes your heart work harder. Canned, packaged, and frozen foods are easy to prepare.  But they are often high in Screening tests and vaccines are an important part of managing your health. A screening test is done to find possible disorders or diseases in people who don't have any symptoms.  The goal is to find a disease early so lifestyle changes can be made and you Colorectal cancer All women at average risk in this age group Multiple tests are available and are used at different times.  Possible tests include:  · Flexible sigmoidoscopy every 5 years, or  · Colonoscopy every 10 years, or  · CT colonography (virtual co Chickenpox (varicella) All women in this age group who have no record of this infection or vaccine 2 doses; the second dose should be given at least 4 weeks after the first dose   Hepatitis A Women at increased risk for infection – talk with your healthcar Diet and exercise Women who are overweight or obese When diagnosed, and then at routine exams   Sexually transmitted infection prevention Women at increased risk for infection – talk with your healthcare provider At routine exams   Use of daily aspirin Wom

## 2020-01-17 ENCOUNTER — HOSPITAL ENCOUNTER (OUTPATIENT)
Dept: MAMMOGRAPHY | Age: 60
Discharge: HOME | End: 2020-01-17
Attending: FAMILY MEDICINE
Payer: COMMERCIAL

## 2020-01-17 DIAGNOSIS — Z12.31 SCREENING MAMMOGRAM, ENCOUNTER FOR: ICD-10-CM

## 2020-01-17 PROCEDURE — 77067 SCR MAMMO BI INCL CAD: CPT | Performed by: FAMILY MEDICINE

## 2020-01-17 PROCEDURE — 77063 BREAST TOMOSYNTHESIS BI: CPT | Performed by: FAMILY MEDICINE

## 2020-01-22 ENCOUNTER — OFFICE VISIT (OUTPATIENT)
Dept: OBGYN CLINIC | Facility: CLINIC | Age: 60
End: 2020-01-22
Payer: COMMERCIAL

## 2020-01-22 VITALS
WEIGHT: 219.5 LBS | HEIGHT: 64 IN | SYSTOLIC BLOOD PRESSURE: 136 MMHG | BODY MASS INDEX: 37.47 KG/M2 | DIASTOLIC BLOOD PRESSURE: 88 MMHG

## 2020-01-22 DIAGNOSIS — Z12.4 SCREENING FOR MALIGNANT NEOPLASM OF THE CERVIX: ICD-10-CM

## 2020-01-22 DIAGNOSIS — Z01.419 WOMEN'S ANNUAL ROUTINE GYNECOLOGICAL EXAMINATION: Primary | ICD-10-CM

## 2020-01-22 PROBLEM — N95.0 POSTMENOPAUSAL BLEEDING: Status: RESOLVED | Noted: 2018-10-10 | Resolved: 2020-01-22

## 2020-01-22 PROCEDURE — 87624 HPV HI-RISK TYP POOLED RSLT: CPT | Performed by: OBSTETRICS & GYNECOLOGY

## 2020-01-22 PROCEDURE — 99396 PREV VISIT EST AGE 40-64: CPT | Performed by: OBSTETRICS & GYNECOLOGY

## 2020-01-22 NOTE — PROGRESS NOTES
GYN ANNUAL    2020  8:11 AM    Patient presents with: Annual: annual exam and pap smear   . HPI: Patient is a 61year old  LMP 2011 presents for annual gyn exam. No further episodes of bleeding or spotting. No gynecoligic complaints.  Denies Relation Age of Onset   • Hypertension Mother    • Cancer Paternal Grandmother         Lung cancer    • Hypertension Brother      Social History    Socioeconomic History      Marital status: Single      Spouse name: Not on file      Number of children: Not normal size, mobile, nontender  Cervix: normal os, no lesions or bleeding  Adnexa: normal size, bilaterally nontender, no palpable masses  Cul-de-sac: normal  R/V: normal perineum, no hemorrhoids  EXTREMITIES: nontender without edema        A/P: Patient is

## 2020-01-24 LAB — HPV I/H RISK 1 DNA SPEC QL NAA+PROBE: NEGATIVE

## 2020-02-23 ENCOUNTER — HEALTH MAINTENANCE LETTER (OUTPATIENT)
Age: 60
End: 2020-02-23

## 2020-03-13 ENCOUNTER — PATIENT MESSAGE (OUTPATIENT)
Dept: FAMILY MEDICINE CLINIC | Facility: CLINIC | Age: 60
End: 2020-03-13

## 2020-03-13 RX ORDER — LEVOTHYROXINE SODIUM 0.05 MG/1
50 TABLET ORAL
Qty: 90 TABLET | Refills: 0 | Status: CANCELLED | OUTPATIENT
Start: 2020-03-13

## 2020-03-13 NOTE — TELEPHONE ENCOUNTER
From: Vy Rosario  To: Stephanie Course, DO  Sent: 3/13/2020 7:42 AM CDT  Subject: Prescription Question    Hi Dr Jose Alejandro Guy,  I know I'm overdue for labs but can you give me 1 refill now on Levothyroxine 50 Mcg so that I have enough to get through the pandemic.  I r

## 2020-03-17 ENCOUNTER — TELEPHONE (OUTPATIENT)
Dept: FAMILY MEDICINE CLINIC | Facility: CLINIC | Age: 60
End: 2020-03-17

## 2020-03-17 RX ORDER — LEVOTHYROXINE SODIUM 0.05 MG/1
50 TABLET ORAL
Qty: 90 TABLET | Refills: 0 | Status: SHIPPED | OUTPATIENT
Start: 2020-03-17 | End: 2020-07-07

## 2020-03-17 RX ORDER — LEVOTHYROXINE SODIUM 0.05 MG/1
50 TABLET ORAL
Qty: 90 TABLET | Refills: 0 | Status: SHIPPED | OUTPATIENT
Start: 2020-03-17 | End: 2020-03-17

## 2020-03-17 NOTE — TELEPHONE ENCOUNTER
This RN informed pt that Rx was refilled by this RN and to call back to schedule BP f/u and have labs drawn incl fasting lipid panel. Pt stated she does not wish to come in for any unnecessary visits with [id number].  This RN reassured pt that she understood a

## 2020-05-19 ENCOUNTER — PATIENT OUTREACH (OUTPATIENT)
Dept: FAMILY MEDICINE CLINIC | Facility: CLINIC | Age: 60
End: 2020-05-19

## 2020-07-07 ENCOUNTER — TELEPHONE (OUTPATIENT)
Dept: FAMILY MEDICINE CLINIC | Facility: CLINIC | Age: 60
End: 2020-07-07

## 2020-07-07 RX ORDER — LEVOTHYROXINE SODIUM 0.05 MG/1
50 TABLET ORAL
Qty: 30 TABLET | Refills: 0 | Status: SHIPPED | OUTPATIENT
Start: 2020-07-07 | End: 2020-07-21

## 2020-07-07 RX ORDER — QUINAPRIL 40 MG/1
40 TABLET ORAL DAILY
Qty: 30 TABLET | Refills: 0 | Status: SHIPPED | OUTPATIENT
Start: 2020-07-07 | End: 2020-08-03

## 2020-07-07 NOTE — TELEPHONE ENCOUNTER
Pt needs refills       Levothyroxine Sodium 50 MCG Oral Tab    Quinapril HCl 40 MG Oral Tab       [id number] - [facility], [phone], [phone]

## 2020-07-07 NOTE — TELEPHONE ENCOUNTER
meds Rf as requested as pt will come in on 7/10/2020 to have labs drawn, pt aware to fast. Pt scheduled for f/u BP and DM (care gap) with [facility] on 7/14/2020. AS notified. Pt verbalized understanding and agrees with POC.

## 2020-07-17 ENCOUNTER — LAB ENCOUNTER (OUTPATIENT)
Dept: LAB | Age: 60
End: 2020-07-17
Attending: FAMILY MEDICINE
Payer: COMMERCIAL

## 2020-07-17 DIAGNOSIS — Z00.01 ENCOUNTER FOR ROUTINE ADULT HEALTH EXAMINATION WITH ABNORMAL FINDINGS: ICD-10-CM

## 2020-07-17 DIAGNOSIS — E03.9 HYPOTHYROIDISM, UNSPECIFIED TYPE: ICD-10-CM

## 2020-07-17 DIAGNOSIS — R73.03 PREDIABETES: ICD-10-CM

## 2020-07-17 DIAGNOSIS — E55.9 VITAMIN D DEFICIENCY: ICD-10-CM

## 2020-07-17 LAB
ALBUMIN SERPL-MCNC: 3.8 G/DL (ref 3.4–5)
ALBUMIN/GLOB SERPL: 1 {RATIO} (ref 1–2)
ALP LIVER SERPL-CCNC: 69 U/L (ref 46–118)
ALT SERPL-CCNC: 49 U/L (ref 13–56)
ANION GAP SERPL CALC-SCNC: 10 MMOL/L (ref 0–18)
AST SERPL-CCNC: 34 U/L (ref 15–37)
BASOPHILS # BLD AUTO: 0.06 X10(3) UL (ref 0–0.2)
BASOPHILS NFR BLD AUTO: 0.7 %
BILIRUB SERPL-MCNC: 1 MG/DL (ref 0.1–2)
BUN BLD-MCNC: 16 MG/DL (ref 7–18)
BUN/CREAT SERPL: 14.3 (ref 10–20)
CALCIUM BLD-MCNC: 9.4 MG/DL (ref 8.5–10.1)
CHLORIDE SERPL-SCNC: 105 MMOL/L (ref 98–112)
CHOLEST SMN-MCNC: 138 MG/DL (ref ?–200)
CO2 SERPL-SCNC: 26 MMOL/L (ref 21–32)
CREAT BLD-MCNC: 1.12 MG/DL (ref 0.55–1.02)
CREAT UR-SCNC: 146 MG/DL
DEPRECATED RDW RBC AUTO: 44.2 FL (ref 35.1–46.3)
EOSINOPHIL # BLD AUTO: 0.07 X10(3) UL (ref 0–0.7)
EOSINOPHIL NFR BLD AUTO: 0.8 %
ERYTHROCYTE [DISTWIDTH] IN BLOOD BY AUTOMATED COUNT: 13.3 % (ref 11–15)
EST. AVERAGE GLUCOSE BLD GHB EST-MCNC: 120 MG/DL (ref 68–126)
GLOBULIN PLAS-MCNC: 3.7 G/DL (ref 2.8–4.4)
GLUCOSE BLD-MCNC: 106 MG/DL (ref 70–99)
HBA1C MFR BLD HPLC: 5.8 % (ref ?–5.7)
HCT VFR BLD AUTO: 43.7 % (ref 35–48)
HDLC SERPL-MCNC: 37 MG/DL (ref 40–59)
HGB BLD-MCNC: 14.7 G/DL (ref 12–16)
IMM GRANULOCYTES # BLD AUTO: 0.05 X10(3) UL (ref 0–1)
IMM GRANULOCYTES NFR BLD: 0.6 %
LDLC SERPL CALC-MCNC: 76 MG/DL (ref ?–100)
LYMPHOCYTES # BLD AUTO: 2.57 X10(3) UL (ref 1–4)
LYMPHOCYTES NFR BLD AUTO: 30.9 %
M PROTEIN MFR SERPL ELPH: 7.5 G/DL (ref 6.4–8.2)
MCH RBC QN AUTO: 30.4 PG (ref 26–34)
MCHC RBC AUTO-ENTMCNC: 33.6 G/DL (ref 31–37)
MCV RBC AUTO: 90.3 FL (ref 80–100)
MICROALBUMIN UR-MCNC: 0.96 MG/DL
MICROALBUMIN/CREAT 24H UR-RTO: 6.6 UG/MG (ref ?–30)
MONOCYTES # BLD AUTO: 1.04 X10(3) UL (ref 0.1–1)
MONOCYTES NFR BLD AUTO: 12.5 %
NEUTROPHILS # BLD AUTO: 4.53 X10 (3) UL (ref 1.5–7.7)
NEUTROPHILS # BLD AUTO: 4.53 X10(3) UL (ref 1.5–7.7)
NEUTROPHILS NFR BLD AUTO: 54.5 %
NONHDLC SERPL-MCNC: 101 MG/DL (ref ?–130)
OSMOLALITY SERPL CALC.SUM OF ELEC: 294 MOSM/KG (ref 275–295)
PATIENT FASTING Y/N/NP: YES
PATIENT FASTING Y/N/NP: YES
PLATELET # BLD AUTO: 345 10(3)UL (ref 150–450)
POTASSIUM SERPL-SCNC: 3.9 MMOL/L (ref 3.5–5.1)
RBC # BLD AUTO: 4.84 X10(6)UL (ref 3.8–5.3)
SODIUM SERPL-SCNC: 141 MMOL/L (ref 136–145)
TRIGL SERPL-MCNC: 124 MG/DL (ref 30–149)
TSI SER-ACNC: 3.56 MIU/ML (ref 0.36–3.74)
VLDLC SERPL CALC-MCNC: 25 MG/DL (ref 0–30)
WBC # BLD AUTO: 8.3 X10(3) UL (ref 4–11)

## 2020-07-17 PROCEDURE — 80061 LIPID PANEL: CPT

## 2020-07-17 PROCEDURE — 82043 UR ALBUMIN QUANTITATIVE: CPT

## 2020-07-17 PROCEDURE — 84443 ASSAY THYROID STIM HORMONE: CPT

## 2020-07-17 PROCEDURE — 85025 COMPLETE CBC W/AUTO DIFF WBC: CPT

## 2020-07-17 PROCEDURE — 80053 COMPREHEN METABOLIC PANEL: CPT

## 2020-07-17 PROCEDURE — 83036 HEMOGLOBIN GLYCOSYLATED A1C: CPT

## 2020-07-17 PROCEDURE — 82306 VITAMIN D 25 HYDROXY: CPT

## 2020-07-17 PROCEDURE — 36415 COLL VENOUS BLD VENIPUNCTURE: CPT

## 2020-07-17 PROCEDURE — 82570 ASSAY OF URINE CREATININE: CPT

## 2020-07-21 ENCOUNTER — TELEPHONE (OUTPATIENT)
Dept: FAMILY MEDICINE CLINIC | Facility: CLINIC | Age: 60
End: 2020-07-21

## 2020-07-21 ENCOUNTER — OFFICE VISIT (OUTPATIENT)
Dept: FAMILY MEDICINE CLINIC | Facility: CLINIC | Age: 60
End: 2020-07-21
Payer: COMMERCIAL

## 2020-07-21 VITALS
WEIGHT: 220 LBS | TEMPERATURE: 98 F | BODY MASS INDEX: 37.56 KG/M2 | SYSTOLIC BLOOD PRESSURE: 118 MMHG | HEIGHT: 64 IN | OXYGEN SATURATION: 98 % | HEART RATE: 84 BPM | DIASTOLIC BLOOD PRESSURE: 78 MMHG

## 2020-07-21 DIAGNOSIS — I10 ESSENTIAL HYPERTENSION: ICD-10-CM

## 2020-07-21 DIAGNOSIS — E03.9 HYPOTHYROIDISM, UNSPECIFIED TYPE: Primary | ICD-10-CM

## 2020-07-21 LAB — 25(OH)D3 SERPL-MCNC: 31 NG/ML (ref 30–100)

## 2020-07-21 PROCEDURE — 3008F BODY MASS INDEX DOCD: CPT | Performed by: FAMILY MEDICINE

## 2020-07-21 PROCEDURE — 99213 OFFICE O/P EST LOW 20 MIN: CPT | Performed by: FAMILY MEDICINE

## 2020-07-21 PROCEDURE — 3074F SYST BP LT 130 MM HG: CPT | Performed by: FAMILY MEDICINE

## 2020-07-21 PROCEDURE — 3078F DIAST BP <80 MM HG: CPT | Performed by: FAMILY MEDICINE

## 2020-07-21 RX ORDER — LEVOTHYROXINE SODIUM 0.07 MG/1
75 TABLET ORAL
Qty: 90 TABLET | Refills: 3 | Status: SHIPPED | OUTPATIENT
Start: 2020-07-21 | End: 2020-11-03

## 2020-07-21 NOTE — TELEPHONE ENCOUNTER
Thierry Horner, DO  You 6 minutes ago (4:32 PM)      Patient last seen by Dr. Adam Cavazos January 15, 2020.  She was supposed to come back in 2 months to get her blood pressure rechecked. [facility] make appointment for patient for blood pressure check with Dr. Gilford Harbour

## 2020-07-21 NOTE — PROGRESS NOTES
HPI:    Patient ID: Gracia Ling is a 61year old female who presents for follow-up of diabetes, hypertension, CKD. Patient is very worried that her diabetes is worse as because of the pandemic she has been having worse eating habits and less exercise. Rom Guevara Emotionally abused: Not on file        Physically abused: Not on file        Forced sexual activity: Not on file    Other Topics      Concerns:         Service: Not Asked        Blood Transfusions: No        Caffeine Concern: Not Asked        Lexx Mckeon cough  Cardiovascular: Negative for:  chest pain, palpitations, orthopnea  GI: Negative for:  abdominal pain, nausea, vomiting, diarrhea, constipation, bleeding  Neurology: Negative for: headache, numbness, weakness,   Genito-Urinary: Negative for: dysuria corrected.  While every attempt is made to correct errors during dictation, discrepancies may still exist.

## 2020-07-21 NOTE — TELEPHONE ENCOUNTER
Informed pt that med was prescribed on July 7,30 tabs per Dr. Gregoria Kim. Informed pt that will let Dr. Dima Torres now and that can request med refill a week in advance. Pt verbalized understanding.

## 2020-07-22 NOTE — TELEPHONE ENCOUNTER
Return in about 3 months (around 10/21/2020) for lab. Provided appointment with Dr. Ian Avelar for 10/21/20 and pt verbalized understanding.

## 2020-08-03 RX ORDER — QUINAPRIL 40 MG/1
TABLET ORAL
Qty: 30 TABLET | Refills: 0 | Status: SHIPPED | OUTPATIENT
Start: 2020-08-03 | End: 2020-08-31

## 2020-08-05 RX ORDER — LEVOTHYROXINE SODIUM 0.05 MG/1
TABLET ORAL
Qty: 30 TABLET | Refills: 0 | OUTPATIENT
Start: 2020-08-05

## 2020-08-31 RX ORDER — QUINAPRIL 40 MG/1
TABLET ORAL
Qty: 30 TABLET | Refills: 0 | Status: SHIPPED | OUTPATIENT
Start: 2020-08-31 | End: 2020-09-28

## 2020-09-28 RX ORDER — QUINAPRIL 40 MG/1
TABLET ORAL
Qty: 30 TABLET | Refills: 0 | Status: SHIPPED | OUTPATIENT
Start: 2020-09-28 | End: 2020-10-24

## 2020-10-24 RX ORDER — QUINAPRIL 40 MG/1
TABLET ORAL
Qty: 30 TABLET | Refills: 0 | Status: SHIPPED | OUTPATIENT
Start: 2020-10-24 | End: 2020-11-03

## 2020-10-29 ENCOUNTER — LAB ENCOUNTER (OUTPATIENT)
Dept: LAB | Facility: REFERENCE LAB | Age: 60
End: 2020-10-29
Attending: FAMILY MEDICINE
Payer: COMMERCIAL

## 2020-10-29 DIAGNOSIS — E03.9 HYPOTHYROIDISM, UNSPECIFIED TYPE: ICD-10-CM

## 2020-10-29 DIAGNOSIS — I10 ESSENTIAL HYPERTENSION: ICD-10-CM

## 2020-10-29 PROCEDURE — 84439 ASSAY OF FREE THYROXINE: CPT

## 2020-10-29 PROCEDURE — 80048 BASIC METABOLIC PNL TOTAL CA: CPT

## 2020-10-29 PROCEDURE — 84443 ASSAY THYROID STIM HORMONE: CPT

## 2020-10-29 PROCEDURE — 36415 COLL VENOUS BLD VENIPUNCTURE: CPT

## 2020-11-03 ENCOUNTER — OFFICE VISIT (OUTPATIENT)
Dept: FAMILY MEDICINE CLINIC | Facility: CLINIC | Age: 60
End: 2020-11-03
Payer: COMMERCIAL

## 2020-11-03 VITALS
HEART RATE: 83 BPM | DIASTOLIC BLOOD PRESSURE: 72 MMHG | BODY MASS INDEX: 37.9 KG/M2 | OXYGEN SATURATION: 98 % | TEMPERATURE: 98 F | SYSTOLIC BLOOD PRESSURE: 124 MMHG | HEIGHT: 64 IN | WEIGHT: 222 LBS

## 2020-11-03 DIAGNOSIS — Z23 NEED FOR VACCINATION: Primary | ICD-10-CM

## 2020-11-03 DIAGNOSIS — J31.0 CHRONIC RHINITIS: ICD-10-CM

## 2020-11-03 DIAGNOSIS — E55.9 VITAMIN D DEFICIENCY: ICD-10-CM

## 2020-11-03 DIAGNOSIS — Z72.820 SLEEP DEFICIENT: ICD-10-CM

## 2020-11-03 DIAGNOSIS — E03.9 HYPOTHYROIDISM, UNSPECIFIED TYPE: ICD-10-CM

## 2020-11-03 DIAGNOSIS — R73.03 PREDIABETES: ICD-10-CM

## 2020-11-03 DIAGNOSIS — I10 ESSENTIAL HYPERTENSION: ICD-10-CM

## 2020-11-03 PROCEDURE — 99214 OFFICE O/P EST MOD 30 MIN: CPT | Performed by: FAMILY MEDICINE

## 2020-11-03 PROCEDURE — 3008F BODY MASS INDEX DOCD: CPT | Performed by: FAMILY MEDICINE

## 2020-11-03 PROCEDURE — 90471 IMMUNIZATION ADMIN: CPT | Performed by: FAMILY MEDICINE

## 2020-11-03 PROCEDURE — 99072 ADDL SUPL MATRL&STAF TM PHE: CPT | Performed by: FAMILY MEDICINE

## 2020-11-03 PROCEDURE — 3078F DIAST BP <80 MM HG: CPT | Performed by: FAMILY MEDICINE

## 2020-11-03 PROCEDURE — 90686 IIV4 VACC NO PRSV 0.5 ML IM: CPT | Performed by: FAMILY MEDICINE

## 2020-11-03 PROCEDURE — 3074F SYST BP LT 130 MM HG: CPT | Performed by: FAMILY MEDICINE

## 2020-11-03 RX ORDER — LEVOTHYROXINE SODIUM 0.07 MG/1
75 TABLET ORAL
Qty: 90 TABLET | Refills: 11 | Status: SHIPPED | OUTPATIENT
Start: 2020-11-03 | End: 2021-07-08

## 2020-11-03 RX ORDER — QUINAPRIL 40 MG/1
40 TABLET ORAL DAILY
Qty: 30 TABLET | Refills: 11 | Status: SHIPPED | OUTPATIENT
Start: 2020-11-03 | End: 2021-11-01

## 2020-11-03 NOTE — PROGRESS NOTES
HPI:    Patient ID: Coco Canas is a 61year old female who presents for f/u. Pt does not sleep well. She gets to sleep fine, but wakes up in the middle of the night with her mind racing. Pt snores. Does not feel refreshed in the morning.   She has n organization: Not on file        Attends meetings of clubs or organizations: Not on file        Relationship status: Not on file      Intimate partner violence        Fear of current or ex partner: Not on file        Emotionally abused: Not on file SYSTEMS:  Constitutional: Negative for: weight change, fever, fatigue, cold/heat intolerance  Eyes: Negative for:  Visual changes, proptosis, blurring, floaters, poor night vision, impaired color vision  ENT: Negative for:  dysphagia, neck swelling, dyspho deficient    7. Chronic rhinitis  Start Rhinocort  Spent 30 minutes face-to-face with patient with more than 15 minutes spent counseling regarding diet, exercise, and sleep habits.   Patient is do so a tennis ball in the back of her night shirt, seeing home

## 2020-11-03 NOTE — PATIENT INSTRUCTIONS
Help you lose weight and may be even sleep better organized start intermittent fasting    Every week decrease the number of hours you eat by 1 hour    Week 1:  7 am - 7 pm  Week 2:  7 am - 6 PM  Week 3:  7 am - 5 PM  Week 4   7 am - 4 PM  Week 5   8 Am - 4

## 2020-12-06 ENCOUNTER — HEALTH MAINTENANCE LETTER (OUTPATIENT)
Age: 60
End: 2020-12-06

## 2021-07-08 RX ORDER — LEVOTHYROXINE SODIUM 0.07 MG/1
75 TABLET ORAL
Qty: 90 TABLET | Refills: 1 | Status: SHIPPED | OUTPATIENT
Start: 2021-07-08 | End: 2021-12-22

## 2021-07-08 NOTE — TELEPHONE ENCOUNTER
A refill request was received for:  Requested Prescriptions     Pending Prescriptions Disp Refills   • Levothyroxine Sodium 75 MCG Oral Tab 90 tablet 11     Sig: Take 1 tablet (75 mcg total) by mouth before breakfast.     Last refill date: 11/03/2020  Qty:

## 2021-08-05 ENCOUNTER — HOSPITAL ENCOUNTER (OUTPATIENT)
Dept: MAMMOGRAPHY | Age: 61
Discharge: HOME | End: 2021-08-05
Attending: FAMILY MEDICINE
Payer: COMMERCIAL

## 2021-08-05 DIAGNOSIS — Z12.31 VISIT FOR SCREENING MAMMOGRAM: ICD-10-CM

## 2021-08-05 PROCEDURE — 77067 SCR MAMMO BI INCL CAD: CPT | Performed by: FAMILY MEDICINE

## 2021-08-05 PROCEDURE — 77063 BREAST TOMOSYNTHESIS BI: CPT | Performed by: FAMILY MEDICINE

## 2021-08-06 ENCOUNTER — PATIENT MESSAGE (OUTPATIENT)
Dept: FAMILY MEDICINE CLINIC | Facility: CLINIC | Age: 61
End: 2021-08-06

## 2021-08-06 DIAGNOSIS — R92.8 ABNORMAL MAMMOGRAM OF LEFT BREAST: Primary | ICD-10-CM

## 2021-08-06 NOTE — TELEPHONE ENCOUNTER
Impression   CONCLUSION:         BI-RADS CATEGORY:     DIAGNOSTIC CATEGORY 0--INCOMPLETE ASSESSMENT: NEED ADDITIONAL IMAGING EVALUATION.          RECOMMENDATIONS:   ADDITIONAL MAMMOGRAPHIC VIEWS REQUIRED: LEFT BREAST  --We will call the patient back for add

## 2021-08-06 NOTE — TELEPHONE ENCOUNTER
Nader Sanderson,   Emmg 10 Dr. Roby Galarza 10 minutes ago (3:12 PM)     Ordered. Please notify patient on how to schedule. Message text      Called pt and informed of diagnostic mammo/ultrsound order placed, call to schedule and verbalized understanding.

## 2021-08-09 ENCOUNTER — HOSPITAL ENCOUNTER (OUTPATIENT)
Dept: ULTRASOUND IMAGING | Facility: HOSPITAL | Age: 61
Discharge: HOME | End: 2021-08-09
Attending: FAMILY MEDICINE
Payer: COMMERCIAL

## 2021-08-09 ENCOUNTER — HOSPITAL ENCOUNTER (OUTPATIENT)
Dept: MAMMOGRAPHY | Facility: HOSPITAL | Age: 61
Discharge: HOME | End: 2021-08-09
Attending: FAMILY MEDICINE
Payer: COMMERCIAL

## 2021-08-09 DIAGNOSIS — R92.8 ABNORMAL MAMMOGRAM OF LEFT BREAST: ICD-10-CM

## 2021-08-09 PROCEDURE — 77061 BREAST TOMOSYNTHESIS UNI: CPT | Performed by: FAMILY MEDICINE

## 2021-08-09 PROCEDURE — 77065 DX MAMMO INCL CAD UNI: CPT | Performed by: FAMILY MEDICINE

## 2021-08-09 PROCEDURE — 76642 ULTRASOUND BREAST LIMITED: CPT | Performed by: FAMILY MEDICINE

## 2021-09-25 ENCOUNTER — HEALTH MAINTENANCE LETTER (OUTPATIENT)
Age: 61
End: 2021-09-25

## 2021-10-30 ENCOUNTER — PATIENT MESSAGE (OUTPATIENT)
Dept: FAMILY MEDICINE CLINIC | Facility: CLINIC | Age: 61
End: 2021-10-30

## 2021-11-01 RX ORDER — QUINAPRIL 40 MG/1
40 TABLET ORAL DAILY
Qty: 30 TABLET | Refills: 1 | Status: SHIPPED | OUTPATIENT
Start: 2021-11-01 | End: 2021-12-22

## 2021-11-01 NOTE — TELEPHONE ENCOUNTER
RN contacted pt, pt scheduled for annual with ALS 12/22/21. 60 day refill sent as requested. Pt agrees to [address]. Disp Refills Start End    Quinapril HCl 40 MG Oral Tab 30 tablet 1 11/1/2021     Sig - Route: Take 1 tablet (40 mg total) by mouth daily.  -

## 2021-11-13 ENCOUNTER — HOSPITAL ENCOUNTER (OUTPATIENT)
Age: 61
Discharge: HOME | End: 2021-11-13
Payer: COMMERCIAL

## 2021-11-13 VITALS
BODY MASS INDEX: 35.85 KG/M2 | OXYGEN SATURATION: 100 % | TEMPERATURE: 99 F | RESPIRATION RATE: 16 BRPM | HEIGHT: 64 IN | HEART RATE: 102 BPM | WEIGHT: 210 LBS | DIASTOLIC BLOOD PRESSURE: 83 MMHG | SYSTOLIC BLOOD PRESSURE: 149 MMHG

## 2021-11-13 DIAGNOSIS — J06.9 VIRAL URI WITH COUGH: Primary | ICD-10-CM

## 2021-11-13 PROCEDURE — 99213 OFFICE O/P EST LOW 20 MIN: CPT | Performed by: PHYSICIAN ASSISTANT

## 2021-11-13 RX ORDER — BENZONATATE 100 MG/1
100 CAPSULE ORAL 3 TIMES DAILY PRN
Qty: 30 CAPSULE | Refills: 0 | Status: SHIPPED | OUTPATIENT
Start: 2021-11-13 | End: 2021-11-23

## 2021-11-13 NOTE — ED INITIAL ASSESSMENT (HPI)
Pt here with concerns of a possible sinus infection, pt has had congestion and cough, for 1 week, pt denies any sob or fever

## 2021-11-13 NOTE — ED PROVIDER NOTES
Patient Seen in: [facility]      History   Patient presents with:  Sinus Problem    Stated Complaint: Sinus issues    Subjective:   HPI    30-year-old female with past medical history of hypertension, diabetes here for evaluation of cough, c acute distress. HENT:      Head: Normocephalic and atraumatic.       Right Ear: External ear normal.      Left Ear: External ear normal.      Nose: Nose normal.      Mouth/Throat:      Mouth: Mucous membranes are moist.   Eyes:      Extraocular Movements: Disp-30 capsule, R-0

## 2021-11-20 ENCOUNTER — HEALTH MAINTENANCE LETTER (OUTPATIENT)
Age: 61
End: 2021-11-20

## 2021-12-22 ENCOUNTER — LAB ENCOUNTER (OUTPATIENT)
Dept: LAB | Facility: REFERENCE LAB | Age: 61
End: 2021-12-22
Attending: FAMILY MEDICINE
Payer: COMMERCIAL

## 2021-12-22 ENCOUNTER — OFFICE VISIT (OUTPATIENT)
Dept: FAMILY MEDICINE CLINIC | Facility: CLINIC | Age: 61
End: 2021-12-22
Payer: COMMERCIAL

## 2021-12-22 VITALS
HEIGHT: 64 IN | SYSTOLIC BLOOD PRESSURE: 130 MMHG | BODY MASS INDEX: 38.04 KG/M2 | DIASTOLIC BLOOD PRESSURE: 80 MMHG | OXYGEN SATURATION: 98 % | WEIGHT: 222.81 LBS | HEART RATE: 93 BPM

## 2021-12-22 DIAGNOSIS — Z00.00 ENCOUNTER FOR ROUTINE ADULT HEALTH EXAMINATION WITHOUT ABNORMAL FINDINGS: ICD-10-CM

## 2021-12-22 DIAGNOSIS — Z76.89 ENCOUNTER FOR WEIGHT MANAGEMENT: ICD-10-CM

## 2021-12-22 DIAGNOSIS — E55.9 VITAMIN D DEFICIENCY: ICD-10-CM

## 2021-12-22 DIAGNOSIS — Z23 NEED FOR VACCINATION: ICD-10-CM

## 2021-12-22 DIAGNOSIS — Z12.11 COLON CANCER SCREENING: ICD-10-CM

## 2021-12-22 DIAGNOSIS — Z00.00 ENCOUNTER FOR ROUTINE ADULT HEALTH EXAMINATION WITHOUT ABNORMAL FINDINGS: Primary | ICD-10-CM

## 2021-12-22 DIAGNOSIS — E03.9 HYPOTHYROIDISM, UNSPECIFIED TYPE: ICD-10-CM

## 2021-12-22 PROCEDURE — 3079F DIAST BP 80-89 MM HG: CPT | Performed by: FAMILY MEDICINE

## 2021-12-22 PROCEDURE — 82306 VITAMIN D 25 HYDROXY: CPT | Performed by: FAMILY MEDICINE

## 2021-12-22 PROCEDURE — 99396 PREV VISIT EST AGE 40-64: CPT | Performed by: FAMILY MEDICINE

## 2021-12-22 PROCEDURE — 80053 COMPREHEN METABOLIC PANEL: CPT

## 2021-12-22 PROCEDURE — 80061 LIPID PANEL: CPT

## 2021-12-22 PROCEDURE — 36415 COLL VENOUS BLD VENIPUNCTURE: CPT

## 2021-12-22 PROCEDURE — 84443 ASSAY THYROID STIM HORMONE: CPT

## 2021-12-22 PROCEDURE — 82043 UR ALBUMIN QUANTITATIVE: CPT

## 2021-12-22 PROCEDURE — 84439 ASSAY OF FREE THYROXINE: CPT

## 2021-12-22 PROCEDURE — 83036 HEMOGLOBIN GLYCOSYLATED A1C: CPT

## 2021-12-22 PROCEDURE — 3075F SYST BP GE 130 - 139MM HG: CPT | Performed by: FAMILY MEDICINE

## 2021-12-22 PROCEDURE — 90471 IMMUNIZATION ADMIN: CPT | Performed by: FAMILY MEDICINE

## 2021-12-22 PROCEDURE — 82570 ASSAY OF URINE CREATININE: CPT

## 2021-12-22 PROCEDURE — 85025 COMPLETE CBC W/AUTO DIFF WBC: CPT

## 2021-12-22 PROCEDURE — 3008F BODY MASS INDEX DOCD: CPT | Performed by: FAMILY MEDICINE

## 2021-12-22 PROCEDURE — 90686 IIV4 VACC NO PRSV 0.5 ML IM: CPT | Performed by: FAMILY MEDICINE

## 2021-12-22 RX ORDER — PHENTERMINE HYDROCHLORIDE 15 MG/1
15 CAPSULE ORAL EVERY MORNING
Qty: 30 CAPSULE | Refills: 0 | Status: SHIPPED | OUTPATIENT
Start: 2021-12-22 | End: 2022-01-07

## 2021-12-22 RX ORDER — LEVOTHYROXINE SODIUM 88 UG/1
88 TABLET ORAL
Qty: 90 TABLET | Refills: 0 | Status: SHIPPED | OUTPATIENT
Start: 2021-12-22

## 2021-12-22 RX ORDER — QUINAPRIL 40 MG/1
40 TABLET ORAL DAILY
Qty: 90 TABLET | Refills: 3 | Status: SHIPPED | OUTPATIENT
Start: 2021-12-22

## 2021-12-22 NOTE — PATIENT INSTRUCTIONS

## 2022-01-15 ENCOUNTER — HEALTH MAINTENANCE LETTER (OUTPATIENT)
Age: 62
End: 2022-01-15

## 2022-02-16 ENCOUNTER — LAB ENCOUNTER (OUTPATIENT)
Dept: LAB | Age: 62
End: 2022-02-16
Attending: FAMILY MEDICINE
Payer: COMMERCIAL

## 2022-02-16 DIAGNOSIS — E03.9 HYPOTHYROIDISM, UNSPECIFIED TYPE: ICD-10-CM

## 2022-02-16 DIAGNOSIS — E55.9 VITAMIN D DEFICIENCY: ICD-10-CM

## 2022-02-16 LAB
T4 FREE SERPL-MCNC: 1.1 NG/DL (ref 0.8–1.7)
TSI SER-ACNC: 6.02 MIU/ML (ref 0.36–3.74)
VIT D+METAB SERPL-MCNC: 52.2 NG/ML (ref 30–100)

## 2022-02-16 PROCEDURE — 82306 VITAMIN D 25 HYDROXY: CPT

## 2022-02-16 PROCEDURE — 84439 ASSAY OF FREE THYROXINE: CPT

## 2022-02-16 PROCEDURE — 36415 COLL VENOUS BLD VENIPUNCTURE: CPT

## 2022-02-16 PROCEDURE — 84443 ASSAY THYROID STIM HORMONE: CPT

## 2022-02-17 ENCOUNTER — TELEPHONE (OUTPATIENT)
Dept: FAMILY MEDICINE CLINIC | Facility: CLINIC | Age: 62
End: 2022-02-17

## 2022-02-17 NOTE — TELEPHONE ENCOUNTER
Pt called to discuss results of labs completed yesterday 2/16. Pt wishes to discuss with provider only. This PSR informed pt that Sudeep Woodruff is currently out of the office this week and will likely be given a call some point next week.

## 2022-02-21 RX ORDER — LEVOTHYROXINE SODIUM 0.1 MG/1
100 TABLET ORAL
Qty: 90 TABLET | Refills: 0 | Status: SHIPPED | OUTPATIENT
Start: 2022-02-21

## 2022-02-21 NOTE — TELEPHONE ENCOUNTER
Called patient about her lab results. States she has lost and gained weight over the last few months so wondering if that contributed to her elevated TSH, but concerned that it has gotten higher despite increasing the dose of her Levothyroxine in December. Let her know that weight fluctuations can impact Levothyroxine dosage and will need to increase it again to 100 mcg daily and repeat TSH in 6 weeks. If still not improving and weight remains stable, will consider endocrinology referral. Will schedule visit for follow-up in April and also discuss Sertraline at that time.

## 2022-04-05 ENCOUNTER — TELEPHONE (OUTPATIENT)
Dept: FAMILY MEDICINE CLINIC | Facility: CLINIC | Age: 62
End: 2022-04-05

## 2022-04-05 NOTE — TELEPHONE ENCOUNTER
Pt called and stated that lab in [facility] office is out of network for her TSH w Reflex. Pt would like a new order to be sent to [address] [place] [address],[address]. Kaity Farrell the phone number is [phone].  This lab was ordered on 02/21/22

## 2022-04-05 NOTE — TELEPHONE ENCOUNTER
Angella Torres fax# [phone]    Faxed with confirmation. Left message for patient to inform her.

## 2022-04-06 ENCOUNTER — TELEPHONE (OUTPATIENT)
Dept: FAMILY MEDICINE CLINIC | Facility: CLINIC | Age: 62
End: 2022-04-06

## 2022-04-06 LAB — AMB EXT TSH: 1.39 MIU/ML

## 2022-04-27 ENCOUNTER — PATIENT MESSAGE (OUTPATIENT)
Dept: FAMILY MEDICINE CLINIC | Facility: CLINIC | Age: 62
End: 2022-04-27

## 2022-04-27 NOTE — TELEPHONE ENCOUNTER
From: Whitney Carcamo  To: Anna Vences DO  Sent: 4/27/2022 1:27 PM CDT  Subject: Stopping Sertraline     Hi Doctor,    Krissy Nolan like to begin slowly getting off of Sertraline. Since taking it my appetite has really increased, which I think is a side effect. Can I begin taking half a dose, from 50 mg to 25 mg for a week? Weight is a bigger issue than anxiety.      Thanks, [employer]

## 2022-05-06 RX ORDER — LEVOTHYROXINE SODIUM 0.1 MG/1
100 TABLET ORAL
Qty: 90 TABLET | Refills: 0 | Status: SHIPPED | OUTPATIENT
Start: 2022-05-06 | End: 2022-07-18

## 2022-05-06 NOTE — TELEPHONE ENCOUNTER
Refill passed per [facility] protocol.     Requested Prescriptions   Pending Prescriptions Disp Refills    levothyroxine 100 MCG Oral Tab 90 tablet 0     Sig: Take 1 tablet (100 mcg total) by mouth before breakfast.        Thyroid Medication Protocol Passed - 5/5/2022  5:50 PM        Passed - TSH in past 12 months        Passed - Last TSH value is normal     Lab Results   Component Value Date    TSH 1.390 04/06/2022                 Passed - Appointment in past 12 or next 3 months                  Recent Outpatient Visits              4 months ago Encounter for routine adult health examination without abnormal findings    [address], [place]    Office Visit    1 year ago Need for vaccination    SOM Osman Arminda Hans, [place]    Office Visit    1 year ago Hypothyroidism, unspecified type    SOM Osman Arminda Hans, DO    Office Visit    2 years ago [facility] annual routine gynecological examination    Bjorn Osman MD    Office Visit    2 years ago Encounter for routine adult health examination with abnormal findings    Je Osman Parnell Oct[place]    Office Visit

## 2022-05-30 NOTE — TELEPHONE ENCOUNTER
Refill passed per [place] [facility] protocol. Requested Prescriptions   Pending Prescriptions Disp Refills    sertraline 50 MG Oral Tab 90 tablet 0     Sig: Take 1 tablet (50 mg total) by mouth Noon.         Psychiatric Non-Scheduled (Anti-Anxiety) Passed - 5/30/2022 12:20 PM        Passed - Appointment in last 6 or next 3 months              Recent Outpatient Visits              5 months ago Encounter for routine adult health examination without abnormal findings    [address], [place]    Office Visit    1 year ago Need for vaccination    [address] [place] [address] SOM Hairston Merced Pickles [place]    Office Visit    1 year ago Hypothyroidism, unspecified type    [address] [place] [address] SOM Hairston Merced Pickles     Office Visit    2 years ago [facility] annual routine gynecological examination    [address] [place] [address] Marylu Hairston MD    Office Visit    2 years ago Encounter for routine adult health examination with abnormal findings    [address] [place] [address] [place], [place]    Office Visit

## 2022-07-18 RX ORDER — LEVOTHYROXINE SODIUM 0.1 MG/1
100 TABLET ORAL
Qty: 90 TABLET | Refills: 1 | Status: SHIPPED | OUTPATIENT
Start: 2022-07-18

## 2022-08-02 RX ORDER — LEVOTHYROXINE SODIUM 0.1 MG/1
TABLET ORAL
Qty: 90 TABLET | Refills: 1 | OUTPATIENT
Start: 2022-08-02

## 2022-09-14 ENCOUNTER — HOSPITAL ENCOUNTER (OUTPATIENT)
Dept: MAMMOGRAPHY | Age: 62
Discharge: HOME | End: 2022-09-14
Attending: FAMILY MEDICINE
Payer: COMMERCIAL

## 2022-09-14 DIAGNOSIS — Z12.31 BREAST CANCER SCREENING BY MAMMOGRAM: ICD-10-CM

## 2022-09-14 PROCEDURE — 77063 BREAST TOMOSYNTHESIS BI: CPT | Performed by: FAMILY MEDICINE

## 2022-09-14 PROCEDURE — 77067 SCR MAMMO BI INCL CAD: CPT | Performed by: FAMILY MEDICINE

## 2022-10-12 ENCOUNTER — TELEPHONE (OUTPATIENT)
Dept: FAMILY MEDICINE CLINIC | Facility: CLINIC | Age: 62
End: 2022-10-12

## 2022-10-12 NOTE — TELEPHONE ENCOUNTER
Is she asking for labs ahead of her annual physical? She just checked her thyroid earlier this year and it was normal. Happy to order her annual labs, but would also encourage her to schedule for December when due for a physical.

## 2022-10-12 NOTE — TELEPHONE ENCOUNTER
The patient called to ask if her lab order could be faxed to [address] [place] [address] in [facility]. She also wanted to ask the doctor if there were more lab test that she needed.  The fax # is [phone]

## 2022-10-13 ENCOUNTER — LAB ENCOUNTER (OUTPATIENT)
Dept: LAB | Age: 62
End: 2022-10-13
Attending: FAMILY MEDICINE
Payer: COMMERCIAL

## 2022-10-13 DIAGNOSIS — E03.9 HYPOTHYROIDISM, UNSPECIFIED TYPE: ICD-10-CM

## 2022-10-13 LAB — TSI SER-ACNC: 0.89 MIU/ML (ref 0.36–3.74)

## 2022-10-13 PROCEDURE — 84443 ASSAY THYROID STIM HORMONE: CPT

## 2022-10-13 PROCEDURE — 36415 COLL VENOUS BLD VENIPUNCTURE: CPT

## 2022-11-24 NOTE — TELEPHONE ENCOUNTER
Refill passed per [address] [place] [address] protocol. Requested Prescriptions   Pending Prescriptions Disp Refills    SERTRALINE 50 MG Oral Tab [Pharmacy Med Name: SERTRALINE HCL 50 MG TABLET] 90 tablet 1     Sig: Take 1 tablet (50 mg total) by mouth Noon.        Psychiatric Non-Scheduled (Anti-Anxiety) Passed - 11/24/2022 12:36 AM        Passed - In person appointment or virtual visit in the past 6 mos or appointment in next 3 mos     Recent Outpatient Visits              11 months ago Encounter for routine adult health examination without abnormal findings    [address], [place]    Office Visit    2 years ago Need for vaccination    [address] [place], [place] [address]SOM Cyndee Schlichter, [place]    Office Visit    2 years ago Hypothyroidism, unspecified type    [address] [place], [place] [address]SOM Cyndee Schlichter,     Office Visit    2 years ago [employer] annual routine gynecological examination    [address] [place], [place] [address]Geovany MD    Office Visit    2 years ago Encounter for routine adult health examination with abnormal findings    [address], [place]    Office Visit          Future Appointments         Provider Department Appt Notes    In 1 week Joy Oneill, [address] [place] [address], [facility] 1036 Visits              11 months ago Encounter for routine adult health examination without abnormal findings    [address], [place]    Office Visit    2 years ago Need for vaccination    [address] [place], [place] [address]SOM Cyndee Schlichter,     Office Visit    2 years ago Hypothyroidism, unspecified type    [address] [place], [place] [address]SOM Cyndee Schlichter, [facility]    Office Visit    2 years ago Women's annual routine gynecological examination    Kane Kwon MD    Office Visit    2 years ago Encounter for routine adult health examination with abnormal findings    [address] [place] [address], [address] [place] [address]    Office Visit          Future Appointments         Provider Department Appt Notes    In 1 week Warden Booth, [address] [place] [address], [facility] Current health

## 2022-12-07 ENCOUNTER — OFFICE VISIT (OUTPATIENT)
Dept: FAMILY MEDICINE CLINIC | Facility: CLINIC | Age: 62
End: 2022-12-07
Payer: COMMERCIAL

## 2022-12-07 VITALS
OXYGEN SATURATION: 98 % | BODY MASS INDEX: 37.22 KG/M2 | WEIGHT: 218 LBS | HEART RATE: 83 BPM | DIASTOLIC BLOOD PRESSURE: 86 MMHG | HEIGHT: 64 IN | SYSTOLIC BLOOD PRESSURE: 122 MMHG

## 2022-12-07 DIAGNOSIS — I10 PRIMARY HYPERTENSION: ICD-10-CM

## 2022-12-07 DIAGNOSIS — E66.9 OBESITY, CLASS II, BMI 35-39.9: ICD-10-CM

## 2022-12-07 DIAGNOSIS — R73.03 PREDIABETES: ICD-10-CM

## 2022-12-07 DIAGNOSIS — Z00.00 ENCOUNTER FOR ROUTINE ADULT HEALTH EXAMINATION WITHOUT ABNORMAL FINDINGS: Primary | ICD-10-CM

## 2022-12-07 DIAGNOSIS — Z12.11 COLON CANCER SCREENING: ICD-10-CM

## 2022-12-07 DIAGNOSIS — E03.9 HYPOTHYROIDISM, UNSPECIFIED TYPE: ICD-10-CM

## 2022-12-07 DIAGNOSIS — Z23 NEED FOR VACCINATION: ICD-10-CM

## 2022-12-07 PROCEDURE — 3079F DIAST BP 80-89 MM HG: CPT | Performed by: FAMILY MEDICINE

## 2022-12-07 PROCEDURE — 3074F SYST BP LT 130 MM HG: CPT | Performed by: FAMILY MEDICINE

## 2022-12-07 PROCEDURE — 90471 IMMUNIZATION ADMIN: CPT | Performed by: FAMILY MEDICINE

## 2022-12-07 PROCEDURE — 99214 OFFICE O/P EST MOD 30 MIN: CPT | Performed by: FAMILY MEDICINE

## 2022-12-07 PROCEDURE — 90750 HZV VACC RECOMBINANT IM: CPT | Performed by: FAMILY MEDICINE

## 2022-12-07 PROCEDURE — 99396 PREV VISIT EST AGE 40-64: CPT | Performed by: FAMILY MEDICINE

## 2022-12-07 PROCEDURE — 3008F BODY MASS INDEX DOCD: CPT | Performed by: FAMILY MEDICINE

## 2022-12-07 RX ORDER — QUINAPRIL 40 MG/1
40 TABLET ORAL DAILY
Qty: 90 TABLET | Refills: 3 | Status: SHIPPED | OUTPATIENT
Start: 2022-12-07

## 2022-12-08 ENCOUNTER — LAB ENCOUNTER (OUTPATIENT)
Dept: LAB | Age: 62
End: 2022-12-08
Attending: FAMILY MEDICINE
Payer: COMMERCIAL

## 2022-12-08 DIAGNOSIS — Z00.00 ENCOUNTER FOR ROUTINE ADULT HEALTH EXAMINATION WITHOUT ABNORMAL FINDINGS: ICD-10-CM

## 2022-12-08 LAB
ALBUMIN SERPL-MCNC: 3.8 G/DL (ref 3.4–5)
ALBUMIN/GLOB SERPL: 1 {RATIO} (ref 1–2)
ALP LIVER SERPL-CCNC: 83 U/L
ALT SERPL-CCNC: 35 U/L
ANION GAP SERPL CALC-SCNC: 7 MMOL/L (ref 0–18)
AST SERPL-CCNC: 23 U/L (ref 15–37)
BASOPHILS # BLD AUTO: 0.05 X10(3) UL (ref 0–0.2)
BASOPHILS NFR BLD AUTO: 0.6 %
BILIRUB SERPL-MCNC: 1.2 MG/DL (ref 0.1–2)
BILIRUB UR QL: NEGATIVE
BUN BLD-MCNC: 15 MG/DL (ref 7–18)
BUN/CREAT SERPL: 15.8 (ref 10–20)
CALCIUM BLD-MCNC: 8.8 MG/DL (ref 8.5–10.1)
CHLORIDE SERPL-SCNC: 104 MMOL/L (ref 98–112)
CHOLEST SERPL-MCNC: 152 MG/DL (ref ?–200)
CLARITY UR: CLEAR
CO2 SERPL-SCNC: 27 MMOL/L (ref 21–32)
COLOR UR: YELLOW
CREAT BLD-MCNC: 0.95 MG/DL
DEPRECATED RDW RBC AUTO: 44.6 FL (ref 35.1–46.3)
EOSINOPHIL # BLD AUTO: 0.06 X10(3) UL (ref 0–0.7)
EOSINOPHIL NFR BLD AUTO: 0.7 %
ERYTHROCYTE [DISTWIDTH] IN BLOOD BY AUTOMATED COUNT: 13.7 % (ref 11–15)
EST. AVERAGE GLUCOSE BLD GHB EST-MCNC: 117 MG/DL (ref 68–126)
FASTING PATIENT LIPID ANSWER: YES
FASTING STATUS PATIENT QL REPORTED: YES
GFR SERPLBLD BASED ON 1.73 SQ M-ARVRAT: 68 ML/MIN/1.73M2 (ref 60–?)
GLOBULIN PLAS-MCNC: 3.7 G/DL (ref 2.8–4.4)
GLUCOSE BLD-MCNC: 99 MG/DL (ref 70–99)
GLUCOSE UR-MCNC: NEGATIVE MG/DL
HBA1C MFR BLD: 5.7 % (ref ?–5.7)
HCT VFR BLD AUTO: 45 %
HDLC SERPL-MCNC: 37 MG/DL (ref 40–59)
HGB BLD-MCNC: 14.7 G/DL
HGB UR QL STRIP.AUTO: NEGATIVE
IMM GRANULOCYTES # BLD AUTO: 0.05 X10(3) UL (ref 0–1)
IMM GRANULOCYTES NFR BLD: 0.6 %
KETONES UR-MCNC: NEGATIVE MG/DL
LDLC SERPL CALC-MCNC: 84 MG/DL (ref ?–100)
LEUKOCYTE ESTERASE UR QL STRIP.AUTO: NEGATIVE
LYMPHOCYTES # BLD AUTO: 1.94 X10(3) UL (ref 1–4)
LYMPHOCYTES NFR BLD AUTO: 22.9 %
MCH RBC QN AUTO: 29.3 PG (ref 26–34)
MCHC RBC AUTO-ENTMCNC: 32.7 G/DL (ref 31–37)
MCV RBC AUTO: 89.6 FL
MONOCYTES # BLD AUTO: 0.94 X10(3) UL (ref 0.1–1)
MONOCYTES NFR BLD AUTO: 11.1 %
NEUTROPHILS # BLD AUTO: 5.45 X10 (3) UL (ref 1.5–7.7)
NEUTROPHILS # BLD AUTO: 5.45 X10(3) UL (ref 1.5–7.7)
NEUTROPHILS NFR BLD AUTO: 64.1 %
NITRITE UR QL STRIP.AUTO: NEGATIVE
NONHDLC SERPL-MCNC: 115 MG/DL (ref ?–130)
OSMOLALITY SERPL CALC.SUM OF ELEC: 287 MOSM/KG (ref 275–295)
PH UR: 5.5 [PH] (ref 5–8)
PLATELET # BLD AUTO: 292 10(3)UL (ref 150–450)
POTASSIUM SERPL-SCNC: 3.5 MMOL/L (ref 3.5–5.1)
PROT SERPL-MCNC: 7.5 G/DL (ref 6.4–8.2)
PROT UR-MCNC: NEGATIVE MG/DL
RBC # BLD AUTO: 5.02 X10(6)UL
SODIUM SERPL-SCNC: 138 MMOL/L (ref 136–145)
SP GR UR STRIP: 1.01 (ref 1–1.03)
TRIGL SERPL-MCNC: 180 MG/DL (ref 30–149)
UROBILINOGEN UR STRIP-ACNC: 0.2
VLDLC SERPL CALC-MCNC: 29 MG/DL (ref 0–30)
WBC # BLD AUTO: 8.5 X10(3) UL (ref 4–11)

## 2022-12-08 PROCEDURE — 80061 LIPID PANEL: CPT

## 2022-12-08 PROCEDURE — 80053 COMPREHEN METABOLIC PANEL: CPT

## 2022-12-08 PROCEDURE — 85025 COMPLETE CBC W/AUTO DIFF WBC: CPT

## 2022-12-08 PROCEDURE — 83036 HEMOGLOBIN GLYCOSYLATED A1C: CPT

## 2022-12-08 PROCEDURE — 81003 URINALYSIS AUTO W/O SCOPE: CPT

## 2022-12-08 PROCEDURE — 36415 COLL VENOUS BLD VENIPUNCTURE: CPT

## 2022-12-12 ENCOUNTER — PATIENT MESSAGE (OUTPATIENT)
Dept: FAMILY MEDICINE CLINIC | Facility: CLINIC | Age: 62
End: 2022-12-12

## 2022-12-26 ENCOUNTER — HEALTH MAINTENANCE LETTER (OUTPATIENT)
Age: 62
End: 2022-12-26

## 2023-01-09 LAB — AMB EXT COLOGUARD RESULT: NEGATIVE

## 2023-01-11 RX ORDER — QUINAPRIL 40 MG/1
40 TABLET ORAL DAILY
Qty: 90 TABLET | Refills: 3 | OUTPATIENT
Start: 2023-01-11

## 2023-01-12 ENCOUNTER — TELEPHONE (OUTPATIENT)
Facility: CLINIC | Age: 63
End: 2023-01-12

## 2023-01-12 RX ORDER — LISINOPRIL 40 MG/1
40 TABLET ORAL DAILY
Qty: 90 TABLET | Refills: 1 | Status: SHIPPED | OUTPATIENT
Start: 2023-01-12

## 2023-01-12 RX ORDER — QUINAPRIL 40 MG/1
40 TABLET ORAL DAILY
Qty: 90 TABLET | Refills: 3 | OUTPATIENT
Start: 2023-01-12

## 2023-01-12 NOTE — TELEPHONE ENCOUNTER
Pt is looking to get an alternative prescription for the following medication (see below). She states that every pharmacy she has tried are out of stock. Pt still has 14 pills left of the current medication.       Quinapril HCl 40 MG Oral Tab

## 2023-01-12 NOTE — TELEPHONE ENCOUNTER
Duplicate request, previously addressed. Disp Refills Start End    Quinapril HCl 40 MG Oral Tab 90 tablet 3 12/7/2022     Sig - Route:  Take 1 tablet (40 mg total) by mouth daily. - Oral    Sent to pharmacy as: Quinapril HCl 40 MG Oral Tablet (ACCUPRIL)    E-Prescribing Status: Receipt confirmed by pharmacy (12/7/2022 12:59 PM CST)    Pharmacy  [address], [phone]

## 2023-01-13 RX ORDER — LEVOTHYROXINE SODIUM 0.1 MG/1
100 TABLET ORAL
Qty: 90 TABLET | Refills: 1 | Status: SHIPPED | OUTPATIENT
Start: 2023-01-13

## 2023-01-13 NOTE — TELEPHONE ENCOUNTER
Spoke with patient,  verified. Gave Dr Christie Marker message below about change to Lisinopril and monitor blood pressure to ensure stability. Patient verbalized understanding. This RN called pharmacy, spoke with Aaron Mercedes, who said quinapril was already cancelled.

## 2023-01-13 NOTE — TELEPHONE ENCOUNTER
Duplicate request, previously addressed. Disp Refills Start End    Quinapril HCl 40 MG Oral Tab 90 tablet 3 12/7/2022     Sig - Route:  Take 1 tablet (40 mg total) by mouth daily. - Oral    Sent to pharmacy as: Quinapril HCl 40 MG Oral Tablet (ACCUPRIL)    E-Prescribing Status: Receipt confirmed by pharmacy (12/7/2022 12:59 PM CST)    Pharmacy  [facility] [address] [place] [address], [phone]

## 2023-01-13 NOTE — TELEPHONE ENCOUNTER
Please notify patient I switched her to Lisinopril 40 mg daily, which is the equivalent dose. Please also ensure the pharmacy cancels Quinapril from her formulary. I would have her check her BP at home after switching to make sure it remains stable.

## 2023-01-14 NOTE — TELEPHONE ENCOUNTER
Refill passed per [facility] protocol. Requested Prescriptions   Pending Prescriptions Disp Refills    LEVOTHYROXINE 100 MCG Oral Tab [Pharmacy Med Name: LEVOTHYROXINE 100 MCG TABLET] 90 tablet 1     Sig: TAKE 1 TABLET BY MOUTH BEFORE BREAKFAST. Thyroid Medication Protocol Passed - 1/13/2023 11:35 AM        Passed - TSH in past 12 months        Passed - Last TSH value is normal     Lab Results   Component Value Date    TSH 0.887 10/13/2022                 Passed - In person appointment or virtual visit in the past 12 mos or appointment in next 3 mos     Recent Outpatient Visits              1 month ago Encounter for routine adult health examination without abnormal findings    [address], [place]    Office Visit    1 year ago Encounter for routine adult health examination without abnormal findings    [address], [place]    Office Visit    2 years ago Need for vaccination    [facility], [facility],     Office Visit    2 years ago Hypothyroidism, unspecified type    [facility], [facility],     Office Visit    2 years ago [facility] annual routine gynecological examination    [address], [address], Maira Vences MD    Office Visit          Future Appointments         Provider Department Appt Notes    In 2 months DO Mike Olmos [place] Re-test TSH, A1C and Triglycerides. Discuss weight.                   Recent Outpatient Visits              1 month ago Encounter for routine adult health examination without abnormal findings    [address], [place]    Office Visit    1 year ago Encounter for routine adult health examination without abnormal findings    [address], [facility], [address] [place], [place]    Office Visit    2 years ago Need for vaccination    [facility], Madisyn BAIRES, [place]    Office Visit    2 years ago Hypothyroidism, unspecified type    [address] [facility], [place],     Office Visit    2 years ago [facility] annual routine gynecological examination    [address] [facility], Kelly Shone, MD    Office Visit          Future Appointments         Provider Department Appt Notes    In 2 months La Riddle,  [address] [facility] Re-test TSH, A1C and Triglycerides. Discuss weight.

## 2023-01-16 ENCOUNTER — MED REC SCAN ONLY (OUTPATIENT)
Facility: CLINIC | Age: 63
End: 2023-01-16

## 2023-03-05 RX ORDER — METFORMIN HYDROCHLORIDE 500 MG/1
500 TABLET, EXTENDED RELEASE ORAL 2 TIMES DAILY WITH MEALS
Qty: 180 TABLET | Refills: 3 | Status: SHIPPED | OUTPATIENT
Start: 2023-03-05

## 2023-03-05 NOTE — TELEPHONE ENCOUNTER
Refill passed per [facility], [facility] protocol. Please review updated sig and sign off if approved. Requested Prescriptions   Pending Prescriptions Disp Refills    METFORMIN  MG Oral Tablet 24 Hr [Pharmacy Med Name: METFORMIN HCL  MG TABLET] 166 tablet 0     Sig: TAKE 1 TABLET BY MOUTH DAILY WITH BREAKFAST FOR 14 DAYS, THEN 1 TABLET 2 TIMES DAILY WITH MEALS. Diabetes Medication Protocol Passed - 3/5/2023 12:32 PM        Passed - Last A1C < 7.5 and within past 6 months     Lab Results   Component Value Date    A1C 5.7 (H) 12/08/2022             Passed - In person appointment or virtual visit in the past 6 mos or appointment in next 3 mos     Recent Outpatient Visits              2 months ago Encounter for routine adult health examination without abnormal findings    [address], [place]    Office Visit    1 year ago Encounter for routine adult health examination without abnormal findings    [address], [place]    Office Visit    2 years ago Need for vaccination    [address], Joel BAIRES, [place]    Office Visit    2 years ago Hypothyroidism, unspecified type    [address], Joel BAIR[place],     Office Visit    3 years ago [employer] annual routine gynecological examination    [address], [address], Hilda Cramer MD    Office Visit          Future Appointments         Provider Department Appt Notes    In 1 week Al Jac, [address], [facility] Re-test TSH, A1C and Triglycerides. Discuss weight.                Passed - EGFRCR or GFRNAA > 50     GFR Evaluation  EGFRCR: 68 , resulted on 12/8/2022          Passed - GFR in the past 12 months             Recent Outpatient Visits              2 months ago Encounter for routine adult health examination without abnormal findings    [address] [facility], [address] [place], [place]    Office Visit    1 year ago Encounter for routine adult health examination without abnormal findings    [address], [place]    Office Visit    2 years ago Need for vaccination    [address] [place] [address], [facility] Idalia [place], [place]    Office Visit    2 years ago Hypothyroidism, unspecified type    [address] [place] [address], [facility] Idalia [place], [place]    Office Visit    3 years ago [employer] annual routine gynecological examination    [address], Martinez Cramer MD    Office Visit            Future Appointments         Provider Department Appt Notes    In 1 week Tereso Lincoln DO [facility] Re-test TSH, A1C and Triglycerides. Discuss weight.

## 2023-03-14 ENCOUNTER — OFFICE VISIT (OUTPATIENT)
Facility: CLINIC | Age: 63
End: 2023-03-14
Payer: COMMERCIAL

## 2023-03-14 VITALS
BODY MASS INDEX: 38.07 KG/M2 | WEIGHT: 223 LBS | SYSTOLIC BLOOD PRESSURE: 128 MMHG | HEIGHT: 64 IN | DIASTOLIC BLOOD PRESSURE: 80 MMHG | HEART RATE: 88 BPM | OXYGEN SATURATION: 99 %

## 2023-03-14 DIAGNOSIS — Z13.820 SCREENING FOR OSTEOPOROSIS: ICD-10-CM

## 2023-03-14 DIAGNOSIS — R73.03 PREDIABETES: ICD-10-CM

## 2023-03-14 DIAGNOSIS — Z23 NEED FOR VACCINATION: ICD-10-CM

## 2023-03-14 DIAGNOSIS — I10 PRIMARY HYPERTENSION: ICD-10-CM

## 2023-03-14 DIAGNOSIS — E66.9 OBESITY, CLASS II, BMI 35-39.9: Primary | ICD-10-CM

## 2023-03-14 PROCEDURE — 99214 OFFICE O/P EST MOD 30 MIN: CPT | Performed by: FAMILY MEDICINE

## 2023-03-14 PROCEDURE — 90471 IMMUNIZATION ADMIN: CPT | Performed by: FAMILY MEDICINE

## 2023-03-14 PROCEDURE — 3074F SYST BP LT 130 MM HG: CPT | Performed by: FAMILY MEDICINE

## 2023-03-14 PROCEDURE — 90750 HZV VACC RECOMBINANT IM: CPT | Performed by: FAMILY MEDICINE

## 2023-03-14 PROCEDURE — 3079F DIAST BP 80-89 MM HG: CPT | Performed by: FAMILY MEDICINE

## 2023-03-14 PROCEDURE — 3008F BODY MASS INDEX DOCD: CPT | Performed by: FAMILY MEDICINE

## 2023-04-09 NOTE — TELEPHONE ENCOUNTER
Refill passed per [place] [employer], [facility] protocol. .  Requested Prescriptions   Pending Prescriptions Disp Refills    SERTRALINE 50 MG Oral Tab [Pharmacy Med Name: SERTRALINE HCL 50 MG TABLET] 90 tablet 1     Sig: Take 1 tablet (50 mg total) by mouth Noon.        Psychiatric Non-Scheduled (Anti-Anxiety) Passed - 4/8/2023  2:27 PM        Passed - In person appointment or virtual visit in the past 6 mos or appointment in next 3 mos     Recent Outpatient Visits              3 weeks ago Obesity, Class II, BMI 35-39.9    [facility], [address], [address] [facility], [address]    4 months ago Encounter for routine adult health examination without abnormal findings    [facility], [place]    Office Visit    1 year ago Encounter for routine adult health examination without abnormal findings    [facility], [place]    Office Visit    2 years ago Need for vaccination    [address] [place] [address] [place], [place]    Office Visit    2 years ago Hypothyroidism, unspecified type    [address] [place], [place] [address]kylah [place], [place]    Office Visit          Future Appointments         Provider Department Appt Notes    In 2 months Luz Marina [place], [address], [address], [facility] Follow-up [employer] loss/Wegovy                    Recent Outpatient Visits              3 weeks ago Obesity, Class II, BMI 35-39.9    [facility], [address], [address] [place], [place]    Office Visit    4 months ago Encounter for routine adult health examination without abnormal findings    [address], [address], [address] [place], [place]    Office Visit    1 year ago Encounter for routine adult health examination without abnormal findings    [address] [place] [address], [address] [place] [address]    Office Visit    2 years ago Need for vaccination    [facility]ique Ned, [facility]    Office Visit    2 years ago Hypothyroidism, unspecified type    [facility] [address], [facility]    Office Visit            Future Appointments         Provider Department Appt Notes    In 2 months Rosalind Mckinney [place] Follow-up weight loss/Wegovy

## 2023-04-22 ENCOUNTER — HEALTH MAINTENANCE LETTER (OUTPATIENT)
Age: 63
End: 2023-04-22

## 2023-06-02 NOTE — TELEPHONE ENCOUNTER
Pt is calling requesting her medication:semaglutide-weight management 1 MG/0.5ML Subcutaneous Solution Auto-injector   be sent to another pharmacy due to one being out of stock. Pt is requesting medication be sent to pharmacy :  [place], [address] [facility]  [address], [phone], [phone].  Please assist.

## 2023-06-02 NOTE — TELEPHONE ENCOUNTER
Please review refill protocol failed/ no protocol  Requested Prescriptions   Pending Prescriptions Disp Refills    semaglutide-weight management 1 MG/0.5ML Subcutaneous Solution Auto-injector 6 mL 3     Sig: Inject 0.5 mL (1 mg total) into the skin once a week.        There is no refill protocol information for this order

## 2023-06-19 ENCOUNTER — TELEPHONE (OUTPATIENT)
Facility: CLINIC | Age: 63
End: 2023-06-19

## 2023-06-19 ENCOUNTER — LAB ENCOUNTER (OUTPATIENT)
Dept: LAB | Age: 63
End: 2023-06-19
Attending: FAMILY MEDICINE
Payer: COMMERCIAL

## 2023-06-19 ENCOUNTER — OFFICE VISIT (OUTPATIENT)
Facility: CLINIC | Age: 63
End: 2023-06-19
Payer: COMMERCIAL

## 2023-06-19 VITALS
HEART RATE: 73 BPM | OXYGEN SATURATION: 97 % | BODY MASS INDEX: 34.66 KG/M2 | DIASTOLIC BLOOD PRESSURE: 84 MMHG | HEIGHT: 64 IN | WEIGHT: 203 LBS | SYSTOLIC BLOOD PRESSURE: 122 MMHG

## 2023-06-19 DIAGNOSIS — E03.9 HYPOTHYROIDISM, UNSPECIFIED TYPE: Primary | ICD-10-CM

## 2023-06-19 DIAGNOSIS — E03.9 HYPOTHYROIDISM, UNSPECIFIED TYPE: ICD-10-CM

## 2023-06-19 DIAGNOSIS — E66.9 OBESITY (BMI 30.0-34.9): Primary | ICD-10-CM

## 2023-06-19 DIAGNOSIS — R73.03 PREDIABETES: ICD-10-CM

## 2023-06-19 PROBLEM — Z23 NEED FOR VACCINATION: Status: RESOLVED | Noted: 2020-11-03 | Resolved: 2023-06-19

## 2023-06-19 PROBLEM — E66.811 OBESITY (BMI 30.0-34.9): Status: ACTIVE | Noted: 2018-10-18

## 2023-06-19 LAB
EST. AVERAGE GLUCOSE BLD GHB EST-MCNC: 117 MG/DL (ref 68–126)
HBA1C MFR BLD: 5.7 % (ref ?–5.7)
T3FREE SERPL-MCNC: 2.38 PG/ML (ref 2.4–4.2)
T4 FREE SERPL-MCNC: 1.5 NG/DL (ref 0.8–1.7)
TSI SER-ACNC: 0.15 MIU/ML (ref 0.36–3.74)

## 2023-06-19 PROCEDURE — 84443 ASSAY THYROID STIM HORMONE: CPT

## 2023-06-19 PROCEDURE — 36415 COLL VENOUS BLD VENIPUNCTURE: CPT

## 2023-06-19 PROCEDURE — 3079F DIAST BP 80-89 MM HG: CPT | Performed by: FAMILY MEDICINE

## 2023-06-19 PROCEDURE — 83036 HEMOGLOBIN GLYCOSYLATED A1C: CPT

## 2023-06-19 PROCEDURE — 84439 ASSAY OF FREE THYROXINE: CPT

## 2023-06-19 PROCEDURE — 3008F BODY MASS INDEX DOCD: CPT | Performed by: FAMILY MEDICINE

## 2023-06-19 PROCEDURE — 84481 FREE ASSAY (FT-3): CPT

## 2023-06-19 PROCEDURE — 99214 OFFICE O/P EST MOD 30 MIN: CPT | Performed by: FAMILY MEDICINE

## 2023-06-19 PROCEDURE — 3074F SYST BP LT 130 MM HG: CPT | Performed by: FAMILY MEDICINE

## 2023-06-19 RX ORDER — SEMAGLUTIDE 1 MG/.5ML
1 INJECTION, SOLUTION SUBCUTANEOUS
COMMUNITY
Start: 2023-06-15

## 2023-06-19 RX ORDER — LEVOTHYROXINE SODIUM 88 UG/1
88 TABLET ORAL
Qty: 90 TABLET | Refills: 0 | Status: SHIPPED | OUTPATIENT
Start: 2023-06-19

## 2023-06-19 NOTE — TELEPHONE ENCOUNTER
Patient called (identified name and ),   Has appointment today at 11:30 am.  Thought she was supposed to do TSH and possibly A1C prior (she is taking Wegovy) but no orders in system. Dr Kassandra Choudhary, please advise, do you want to place orders, or see patient first?  She has eaten this morning so would not be fasting.       Future Appointments   Date Time Provider Nettie Rm   2023 11:30 AM Rosa Esparza DO [facility] FP [facility] OP

## 2023-07-13 RX ORDER — LISINOPRIL 40 MG/1
40 TABLET ORAL DAILY
Qty: 90 TABLET | Refills: 1 | Status: SHIPPED | OUTPATIENT
Start: 2023-07-13

## 2023-07-13 NOTE — TELEPHONE ENCOUNTER
Please review. Protocol failed / Has no protocol. No Active/ Future labs pended for CMP     Requested Prescriptions   Pending Prescriptions Disp Refills    LISINOPRIL 40 MG Oral Tab [Pharmacy Med Name: LISINOPRIL 40 MG TABLET] 90 tablet 1     Sig: TAKE 1 TABLET BY MOUTH EVERY DAY       Hypertensive Medications Protocol Failed - 7/12/2023  4:58 PM        Failed - CMP or BMP in past 6 months     No results found for this or any previous visit (from the past 4392 hour(s)). Passed - In person appointment in the past 12 or next 3 months     Recent Outpatient Visits              3 weeks ago Obesity (BMI 30.0-34. 9)    [facility], [facility] [place], [place]    Office Visit    4 months ago Obesity, Class II, BMI 35-39.9    [facility], [address], [address] [place], [place]    Office Visit    7 months ago Encounter for routine adult health examination without abnormal findings    [address], [place]    Office Visit    1 year ago Encounter for routine adult health examination without abnormal findings    [address] [place] [address], [address] [place] [address]    Office Visit    2 years ago Need for vaccination    [address] [place] [address], SOM YAN, Joel Sánchez, [place]    Office Visit          Future Appointments         Provider Department Appt Notes    In 2 months Al Nathan,  [facility], [address] TSH level, A1C               Passed - Last BP reading less than 140/90     BP Readings from Last 1 Encounters:  06/19/23 : 122/84              Passed - In person appointment or virtual visit in the past 6 months     Recent Outpatient Visits              3 weeks ago Obesity (BMI 30.0-34. 9)    [address], [place]    Office Visit    4 months ago Obesity, Class II, BMI 35-39.9    [facility], [address], [address] [place], [place]    Office Visit    7 months ago Encounter for routine adult health examination without abnormal findings    [address], [place]    Office Visit    1 year ago Encounter for routine adult health examination without abnormal findings    [address], [place]    Office Visit    2 years ago Need for vaccination    [address] [facility], Ameya BAIRES, [place]    Office Visit          Future Appointments         Provider Department Appt Notes    In 2 months Garth Swartz DO Sherrie [place]una, [address], [address] TSH level, A1C               Passed - EGFRCR or GFRNAA > 50     GFR Evaluation  EGFRCR: 68 , resulted on 12/8/2022             Future Appointments         Provider Department Appt Notes    In 2 months Garth Swartz DO [address], [address] TSH level, A1C           Recent Outpatient Visits              3 weeks ago Obesity (BMI 30.0-34. 9)    [address], [place]    Office Visit    4 months ago Obesity, Class II, BMI 35-39.9    [facility], [address], [address] [place], [place]    Office Visit    7 months ago Encounter for routine adult health examination without abnormal findings    [address], [place]    Office Visit    1 year ago Encounter for routine adult health examination without abnormal findings    [address] [facility], [address] [place], [place]    Office Visit    2 years ago Need for vaccination    [address] [facility], Ameya BAIRES, [place]    Office Visit

## 2023-07-28 ENCOUNTER — LAB ENCOUNTER (OUTPATIENT)
Dept: LAB | Age: 63
End: 2023-07-28
Attending: FAMILY MEDICINE
Payer: COMMERCIAL

## 2023-07-28 DIAGNOSIS — E03.9 HYPOTHYROIDISM, UNSPECIFIED TYPE: ICD-10-CM

## 2023-07-28 LAB — TSI SER-ACNC: 0.72 MIU/ML (ref 0.36–3.74)

## 2023-07-28 PROCEDURE — 36415 COLL VENOUS BLD VENIPUNCTURE: CPT

## 2023-07-28 PROCEDURE — 84443 ASSAY THYROID STIM HORMONE: CPT

## 2023-08-24 NOTE — TELEPHONE ENCOUNTER
Please review. Protocol failed / No protocol. Requested Prescriptions   Pending Prescriptions Disp Refills    semaglutide-weight management 1.7 MG/0.75ML Subcutaneous Solution Auto-injector 6 mL 0     Sig: Inject 0.75 mL (1.7 mg total) into the skin once a week. There is no refill protocol information for this order          Recent Outpatient Visits              2 months ago Obesity (BMI 30.0-34. 9)    [address], [place]    Office Visit    5 months ago Obesity, Class II, BMI 35-39.9    [facility], [address], [address] [place], [place]    Office Visit    8 months ago Encounter for routine adult health examination without abnormal findings    [address], [place]    Office Visit    1 year ago Encounter for routine adult health examination without abnormal findings    Janneth Alves, [address] [place], [place]    Office Visit    2 years ago Need for vaccination    [facility] [address], [place], [place]    Office Visit            Future Appointments         Provider Department Appt Notes    In 2 weeks DO Janneth Dawson [place] TSH level, A1C

## 2023-09-08 RX ORDER — LEVOTHYROXINE SODIUM 88 UG/1
88 TABLET ORAL
Qty: 90 TABLET | Refills: 3 | Status: SHIPPED | OUTPATIENT
Start: 2023-09-08

## 2023-09-08 NOTE — TELEPHONE ENCOUNTER
Refill passed per [place] [employer], [facility] protocol. Requested Prescriptions   Pending Prescriptions Disp Refills    levothyroxine 88 MCG Oral Tab 90 tablet 0     Sig: Take 1 tablet (88 mcg total) by mouth before breakfast.       Thyroid Medication Protocol Passed - 9/6/2023 11:55 AM        Passed - TSH in past 12 months        Passed - Last TSH value is normal     Lab Results   Component Value Date    TSH 0.725 07/28/2023                 Passed - In person appointment or virtual visit in the past 12 mos or appointment in next 3 mos     Recent Outpatient Visits              2 months ago Obesity (BMI 30.0-34. 9)    [facility] [place] [place]    [facility] Visit    5 months ago Obesity, Class II, BMI 35-39.9    [facility], [address] [place], [place]    Office Visit    9 months ago Encounter for routine adult health examination without abnormal findings    [facility], [place]    Office Visit    1 year ago Encounter for routine adult health examination without abnormal findings    [facility] [place] [place]    Office Visit    2 years ago Need for vaccination    [facility] [place], [place]    Office Visit          Future Appointments         Provider Department Appt Notes    In 3 days Maegan Farah, [address] TSH level, A1C                     Recent Outpatient Visits              2 months ago Obesity (BMI 30.0-34. 9)    [facility] [address], [place]    Office Visit    5 months ago Obesity, Class II, BMI 35-39.9    [facility], [address] [place], [place]    Office Visit    9 months ago Encounter for routine adult health examination without abnormal findings    [facility] [address] [place],     Office Visit    1 year ago Encounter for routine adult health examination without abnormal findings    [address], [place]    Office Visit    2 years ago Need for vaccination    [facility], Danya [place], [place]    Office Visit             Future Appointments         Provider Department Appt Notes    In 3 days Deep Tiwari, [facility] TSH level, A1C

## 2023-09-11 ENCOUNTER — OFFICE VISIT (OUTPATIENT)
Facility: CLINIC | Age: 63
End: 2023-09-11
Payer: COMMERCIAL

## 2023-09-11 VITALS
DIASTOLIC BLOOD PRESSURE: 66 MMHG | OXYGEN SATURATION: 98 % | SYSTOLIC BLOOD PRESSURE: 108 MMHG | HEIGHT: 64 IN | WEIGHT: 189 LBS | BODY MASS INDEX: 32.27 KG/M2 | HEART RATE: 70 BPM

## 2023-09-11 DIAGNOSIS — E66.9 OBESITY (BMI 30.0-34.9): Primary | ICD-10-CM

## 2023-09-11 DIAGNOSIS — R73.03 PREDIABETES: ICD-10-CM

## 2023-09-11 DIAGNOSIS — I10 PRIMARY HYPERTENSION: ICD-10-CM

## 2023-09-11 DIAGNOSIS — Z12.31 VISIT FOR SCREENING MAMMOGRAM: ICD-10-CM

## 2023-09-11 DIAGNOSIS — E03.9 HYPOTHYROIDISM, UNSPECIFIED TYPE: ICD-10-CM

## 2023-09-11 PROCEDURE — 99214 OFFICE O/P EST MOD 30 MIN: CPT | Performed by: FAMILY MEDICINE

## 2023-09-11 PROCEDURE — 3074F SYST BP LT 130 MM HG: CPT | Performed by: FAMILY MEDICINE

## 2023-09-11 PROCEDURE — 3078F DIAST BP <80 MM HG: CPT | Performed by: FAMILY MEDICINE

## 2023-09-11 PROCEDURE — 3008F BODY MASS INDEX DOCD: CPT | Performed by: FAMILY MEDICINE

## 2023-09-11 RX ORDER — LISINOPRIL 20 MG/1
20 TABLET ORAL DAILY
Qty: 90 TABLET | Refills: 1 | Status: SHIPPED | OUTPATIENT
Start: 2023-09-11

## 2023-10-06 ENCOUNTER — PATIENT MESSAGE (OUTPATIENT)
Facility: CLINIC | Age: 63
End: 2023-10-06

## 2023-10-06 DIAGNOSIS — E66.9 OBESITY (BMI 30.0-34.9): Primary | ICD-10-CM

## 2023-10-06 NOTE — TELEPHONE ENCOUNTER
From: Gary Friend  To: Rambo Rivas  Sent: 10/6/2023 6:59 AM CDT  Subject: PYUZBC Dosage - Aaron Nguyen,    I'm ready to move up to the 2.4 dose. My body has adjusted to the 1.7 over the last couple of months and my weight loss is stalling. Can you write a script for me for [facility] 2. 4? [facility] in [place] on file. Thanks!

## 2023-10-11 RX ORDER — ONDANSETRON 4 MG/1
4 TABLET, FILM COATED ORAL EVERY 8 HOURS PRN
Qty: 30 TABLET | Refills: 1 | Status: SHIPPED | OUTPATIENT
Start: 2023-10-11

## 2023-10-11 NOTE — TELEPHONE ENCOUNTER
Please review. Protocol failed / Has no protocol. Requested Prescriptions   Pending Prescriptions Disp Refills    ondansetron (ZOFRAN) 4 mg tablet [Pharmacy Med Name: ONDANSETRON HCL 4 MG TABLET] 30 tablet 1     Sig: TAKE 1 TABLET BY MOUTH EVERY 8 HOURS AS NEEDED FOR NAUSEA       There is no refill protocol information for this order        Future Appointments         Provider Department Appt Notes    In 3 weeks ADO DEXA RM1; ADO TATO [facility]     In 1 month DO Levy Steinberg, [facility]            Recent Outpatient Visits              1 month ago Obesity (BMI 30.0-34. 9)    [facility] [place]    Office Visit    3 months ago Obesity (BMI 30.0-34. 9)    [facility] [place]    Office Visit    7 months ago Obesity, Class II, BMI 35-39.9    [facility], [address] [place], [place]    Office Visit    10 months ago Encounter for routine adult health examination without abnormal findings    [facility], [address] [place], [place]    Office Visit    1 year ago Encounter for routine adult health examination without abnormal findings    [facility], [address] [place], [place]    Office Visit

## 2023-10-30 ENCOUNTER — NURSE TRIAGE (OUTPATIENT)
Facility: CLINIC | Age: 63
End: 2023-10-30

## 2023-10-30 ENCOUNTER — TELEPHONE (OUTPATIENT)
Dept: FAMILY MEDICINE CLINIC | Facility: CLINIC | Age: 63
End: 2023-10-30

## 2023-10-30 NOTE — TELEPHONE ENCOUNTER
Patient has 10am virtual ,per .Lakeisha can only see in person due to chief complaint.   [id number] Priscila Ramesh for patient to come in at 1pm

## 2023-10-30 NOTE — TELEPHONE ENCOUNTER
Action Requested: Summary for Provider     []  Critical Lab, Recommendations Needed  [] Need Additional Advice  []   FYI    []   Need Orders  [] Need Medications Sent to Pharmacy  [x]  Other     SUMMARY:   Verified name and . Patient reports sinus infection symptoms and left ear pain and congestion. She denies any fever, changes in hearing, redness around the ear, or drainage from the ear. Patient states she is unable to come into the office today due to no transportation. She is requesting antibiotic prescription.     Virtual visit scheduled:  Future Appointments   Date Time Provider Nettie mR   10/30/2023 10:00 AM Thressa Ronde [facility] [place]   2023 10:20 AM ADO TATO RM1 ADO TATO EM [place]   2023 10:30 AM Cyril Layton DO EMMG 14 FP EMMG 10 OP       Reason for call: Acute  Onset: Data Unavailable        Reason for Disposition   Ear congestion present > 48 hours    Protocols used: Ear - Congestion-A-OH

## 2023-11-07 ENCOUNTER — HOSPITAL ENCOUNTER (OUTPATIENT)
Age: 63
Discharge: HOME | End: 2023-11-07
Payer: COMMERCIAL

## 2023-11-07 ENCOUNTER — HOSPITAL ENCOUNTER (OUTPATIENT)
Dept: MAMMOGRAPHY | Age: 63
Discharge: HOME | End: 2023-11-07
Attending: FAMILY MEDICINE
Payer: COMMERCIAL

## 2023-11-07 VITALS
RESPIRATION RATE: 18 BRPM | DIASTOLIC BLOOD PRESSURE: 71 MMHG | TEMPERATURE: 100 F | HEART RATE: 92 BPM | SYSTOLIC BLOOD PRESSURE: 126 MMHG | OXYGEN SATURATION: 99 %

## 2023-11-07 DIAGNOSIS — H65.03 NON-RECURRENT ACUTE SEROUS OTITIS MEDIA OF BOTH EARS: Primary | ICD-10-CM

## 2023-11-07 DIAGNOSIS — Z12.31 VISIT FOR SCREENING MAMMOGRAM: ICD-10-CM

## 2023-11-07 PROCEDURE — 99213 OFFICE O/P EST LOW 20 MIN: CPT | Performed by: NURSE PRACTITIONER

## 2023-11-07 PROCEDURE — 77063 BREAST TOMOSYNTHESIS BI: CPT | Performed by: FAMILY MEDICINE

## 2023-11-07 PROCEDURE — 77067 SCR MAMMO BI INCL CAD: CPT | Performed by: FAMILY MEDICINE

## 2023-11-07 RX ORDER — TRIAMCINOLONE ACETONIDE 55 UG/1
1 SPRAY, METERED NASAL 2 TIMES DAILY
Qty: 10.8 ML | Refills: 0 | Status: SHIPPED | OUTPATIENT
Start: 2023-11-07

## 2023-11-07 NOTE — ED INITIAL ASSESSMENT (HPI)
Pt here with left ear congestion and pain for 2 weeks, head congestion for 1 week and right ear congestion and pain for 3 days. Pt had 2 negative covid tests at home.

## 2023-11-09 ENCOUNTER — HOSPITAL ENCOUNTER (OUTPATIENT)
Dept: MAMMOGRAPHY | Facility: HOSPITAL | Age: 63
Discharge: HOME | End: 2023-11-09
Attending: FAMILY MEDICINE
Payer: COMMERCIAL

## 2023-11-09 DIAGNOSIS — R92.8 ABNORMAL MAMMOGRAM: ICD-10-CM

## 2023-11-09 PROCEDURE — 77061 BREAST TOMOSYNTHESIS UNI: CPT | Performed by: FAMILY MEDICINE

## 2023-11-09 PROCEDURE — 77065 DX MAMMO INCL CAD UNI: CPT | Performed by: FAMILY MEDICINE

## 2023-11-10 NOTE — DISCHARGE INSTRUCTIONS
The Doctor (Radiologist) who performed your procedure was: DR Masood Murguia Dr an ice pack over the biopsy site on top of your bra or on top of the ACE wrap (never apply ice directly over skin) for 10-15 minutes of every hour until bedtime for your comfort and to decrease bleeding. Keep your sports bra or the ACE wrap (stereotactic and MRI biopsy) in place for 24 hours after your biopsy. This compression decreases bleeding and breast movement for your comfort. Wear a supportive bra for the next couple of days for comfort (sports bra for sleep). Continue to wear, preferably, a sports bra or good supportive bra for 1 week and take off only to shower. No baths or showers during the first 24 hours after biopsy. After this time you may take a shower. It's okay if the strips get wet but do not soak them. NO saunas, hot tubs or swimming until steri-strips fall off (approx. 5 days). This prevents infection and allows time for them to completely close and heal.  DO NOT remove the steri-strips. They will fall off in 5 days. If any type of irritation (redness, itching or blisters) develops in the area around the steri-strips, remove them gently. If the steri-strips do not fall off after 5 days, gently remove them. Keep the area clean and dry. It is normal to have mild discomfort and bruising at the biopsy site. You may take Tylenol as needed for discomfort, as long as you have no allergies to Tylenol. Do not take aspirin, motrin, ibuprofen or any medication containing NSAID (non-steroidal anti-inflammatory drug) product for 48 hours. DO NOT participate in strenuous activity (aerobics, heavy lifting, housework, gardening, etc.) 48 hours after your biopsy to prevent bleeding. You will receive results in 2-3 business days. If you are having an MRI breast biopsy or an Ultrasound guided breast biopsy, you will be billed for the biopsy and unilateral mammogram separately.   If you have any questions about the procedure or your results, please contact the Breast Care Coordinator Nurse at [phone]. Notify your ordering physician or primary physician for increased bleeding, pain or fever over 100. Or contact a Radiology Nurse at [phone] between 8am-4pm (after 4pm, your call will be directed to the [facility]).

## 2023-11-14 ENCOUNTER — HOSPITAL ENCOUNTER (OUTPATIENT)
Dept: MAMMOGRAPHY | Facility: HOSPITAL | Age: 63
Discharge: HOME | End: 2023-11-14
Attending: FAMILY MEDICINE
Payer: COMMERCIAL

## 2023-11-14 DIAGNOSIS — R92.1 BREAST CALCIFICATION, RIGHT: ICD-10-CM

## 2023-11-14 DIAGNOSIS — R92.8 ABNORMAL MAMMOGRAM: ICD-10-CM

## 2023-11-14 PROCEDURE — 19081 BX BREAST 1ST LESION STRTCTC: CPT | Performed by: FAMILY MEDICINE

## 2023-11-14 PROCEDURE — 88305 TISSUE EXAM BY PATHOLOGIST: CPT | Performed by: FAMILY MEDICINE

## 2023-11-14 NOTE — IMAGING NOTE
History taken and as follows: Single site right breast stereotactic guided biopsy is recommended for upper inner calcifications. 8732 Post instructions  Given verbal et written AVS  summary sheet provided to patient. Patient verbalizes understanding and agreement. 0745 Pt consented at  [facility] Dr Kell Jeff here to explain risk and benefits of procedure. Questions answered by the physician    0801 Time out taken    Placed in chair  at  [address] taken    0806 Chloro prep as skin prep. Lidocaine 1% 10  Milligrams per ml 5 ml given for superficial anesthetic affect     0809 Lidocaine  1% with epinephrine  1:100,000 units for deeper anesthetic affect 15ML given. More images taken after local  was given. Sampling begins at 21     Sampling complete at  0814 Core tainer taken to be imaged. 0815 Formalin added to samples. 0815   BUCKLE clip inserted . Procedure completed . Pressure to site manually by nurse  and by machine compression for 10 minutes . No active bleeding noted. Area cleaned steri strips to site . Ice pack to site . 0827 Cores taken to pathology by [facility]    0830  mammography department for post clip images . Dressing dry and intact. Nipple markers removed by Mayra Plummer. Bra applied per patient. 6\" ace secured over bra by nurse. PT TO BE DISCHARGED ONCE POST IMAGES ARE COMPLETED.

## 2023-11-14 NOTE — PROCEDURES
[facility]  Procedure Note    Nettie Oseas Patient Status:  Outpatient    1960 MRN [id number]   Location [address] [facility] Attending Rajendra Munoz DO   Hosp Day # 0 PCP Eliverto Bloch, DO     Procedure: Right breast stereotactic/андрей guided biopsy    Pre-Procedure Diagnosis:  Right breast calcifications    Post-Procedure Diagnosis: Right breast calcifications    Anesthesia:  Local    Findings:  Right breast calcifications    Specimens: multiple cores    Blood Loss:  minimal    Tourniquet Time: none  Complications:  None  Drains:  none    Sharron Diaz MD  2023

## 2023-11-15 ENCOUNTER — TELEPHONE (OUTPATIENT)
Dept: MAMMOGRAPHY | Facility: HOSPITAL | Age: 63
End: 2023-11-15

## 2023-11-15 NOTE — TELEPHONE ENCOUNTER
[employer] is s/p biopsy . Phoned and introduced myself as breast coordinator . Reinforced to patient  post biopsy care and instructions . No c/o post bx    Informed  and shared the pathology results as well as the recommendations from Dr Gee Sims for her breast imaging  as follows:      Pathology results shared (see epic for dictated pathology and radiology procedure report)  and recommendations are as follows:          Pathology demonstrates benign findings and is concordant with imaging. RECOMMENDATION:  As long as the patient's clinical breast exam remains unchanged, patient should return to annual screening mammogram schedule. [facility] the above and denies questions. [employer] was also instructed to perform breast self exams and if any changes  develops any changes to contact ordering  physician immediately  for re evaluation. .  Violetta Durham understanding and agreement.

## 2023-11-26 ENCOUNTER — HEALTH MAINTENANCE LETTER (OUTPATIENT)
Age: 63
End: 2023-11-26

## 2023-12-08 ENCOUNTER — OFFICE VISIT (OUTPATIENT)
Facility: CLINIC | Age: 63
End: 2023-12-08
Payer: COMMERCIAL

## 2023-12-08 VITALS
HEIGHT: 64 IN | OXYGEN SATURATION: 98 % | HEART RATE: 78 BPM | SYSTOLIC BLOOD PRESSURE: 124 MMHG | BODY MASS INDEX: 33.29 KG/M2 | DIASTOLIC BLOOD PRESSURE: 82 MMHG | WEIGHT: 195 LBS

## 2023-12-08 DIAGNOSIS — I10 PRIMARY HYPERTENSION: ICD-10-CM

## 2023-12-08 DIAGNOSIS — E66.9 OBESITY (BMI 30.0-34.9): ICD-10-CM

## 2023-12-08 DIAGNOSIS — E55.9 VITAMIN D DEFICIENCY: ICD-10-CM

## 2023-12-08 DIAGNOSIS — Z00.00 ENCOUNTER FOR ROUTINE ADULT HEALTH EXAMINATION WITHOUT ABNORMAL FINDINGS: Primary | ICD-10-CM

## 2023-12-08 DIAGNOSIS — R73.03 PREDIABETES: ICD-10-CM

## 2023-12-08 DIAGNOSIS — E03.9 HYPOTHYROIDISM, UNSPECIFIED TYPE: ICD-10-CM

## 2023-12-08 PROCEDURE — 99396 PREV VISIT EST AGE 40-64: CPT | Performed by: FAMILY MEDICINE

## 2023-12-08 PROCEDURE — 3079F DIAST BP 80-89 MM HG: CPT | Performed by: FAMILY MEDICINE

## 2023-12-08 PROCEDURE — 3008F BODY MASS INDEX DOCD: CPT | Performed by: FAMILY MEDICINE

## 2023-12-08 PROCEDURE — 3074F SYST BP LT 130 MM HG: CPT | Performed by: FAMILY MEDICINE

## 2023-12-13 RX ORDER — LEVOTHYROXINE SODIUM 0.1 MG/1
100 TABLET ORAL
Qty: 90 TABLET | Refills: 1 | OUTPATIENT
Start: 2023-12-13

## 2024-03-07 ENCOUNTER — LAB ENCOUNTER (OUTPATIENT)
Dept: LAB | Age: 64
End: 2024-03-07
Attending: FAMILY MEDICINE
Payer: COMMERCIAL

## 2024-03-07 DIAGNOSIS — I10 PRIMARY HYPERTENSION: ICD-10-CM

## 2024-03-07 DIAGNOSIS — E03.9 HYPOTHYROIDISM, UNSPECIFIED TYPE: ICD-10-CM

## 2024-03-07 DIAGNOSIS — Z00.00 ENCOUNTER FOR ROUTINE ADULT HEALTH EXAMINATION WITHOUT ABNORMAL FINDINGS: ICD-10-CM

## 2024-03-07 DIAGNOSIS — E55.9 VITAMIN D DEFICIENCY: ICD-10-CM

## 2024-03-07 LAB
ALBUMIN SERPL-MCNC: 4.5 G/DL (ref 3.2–4.8)
ALBUMIN/GLOB SERPL: 1.6 {RATIO} (ref 1–2)
ALP LIVER SERPL-CCNC: 72 U/L
ALT SERPL-CCNC: 15 U/L
ANION GAP SERPL CALC-SCNC: 1 MMOL/L (ref 0–18)
AST SERPL-CCNC: 21 U/L (ref ?–34)
BASOPHILS # BLD AUTO: 0.05 X10(3) UL (ref 0–0.2)
BASOPHILS NFR BLD AUTO: 0.7 %
BILIRUB SERPL-MCNC: 1 MG/DL (ref 0.2–1.1)
BUN BLD-MCNC: 16 MG/DL (ref 9–23)
BUN/CREAT SERPL: 16.2 (ref 10–20)
CALCIUM BLD-MCNC: 9.7 MG/DL (ref 8.7–10.4)
CHLORIDE SERPL-SCNC: 107 MMOL/L (ref 98–112)
CHOLEST SERPL-MCNC: 171 MG/DL (ref ?–200)
CO2 SERPL-SCNC: 31 MMOL/L (ref 21–32)
CREAT BLD-MCNC: 0.99 MG/DL
CREAT UR-SCNC: 113.2 MG/DL
DEPRECATED RDW RBC AUTO: 42.5 FL (ref 35.1–46.3)
EGFRCR SERPLBLD CKD-EPI 2021: 64 ML/MIN/1.73M2 (ref 60–?)
EOSINOPHIL # BLD AUTO: 0.05 X10(3) UL (ref 0–0.7)
EOSINOPHIL NFR BLD AUTO: 0.7 %
ERYTHROCYTE [DISTWIDTH] IN BLOOD BY AUTOMATED COUNT: 13 % (ref 11–15)
EST. AVERAGE GLUCOSE BLD GHB EST-MCNC: 108 MG/DL (ref 68–126)
FASTING PATIENT LIPID ANSWER: YES
FASTING STATUS PATIENT QL REPORTED: YES
GLOBULIN PLAS-MCNC: 2.9 G/DL (ref 2.8–4.4)
GLUCOSE BLD-MCNC: 86 MG/DL (ref 70–99)
HBA1C MFR BLD: 5.4 % (ref ?–5.7)
HCT VFR BLD AUTO: 43 %
HDLC SERPL-MCNC: 40 MG/DL (ref 40–59)
HGB BLD-MCNC: 15 G/DL
IMM GRANULOCYTES # BLD AUTO: 0.04 X10(3) UL (ref 0–1)
IMM GRANULOCYTES NFR BLD: 0.5 %
LDLC SERPL CALC-MCNC: 99 MG/DL (ref ?–100)
LYMPHOCYTES # BLD AUTO: 2.11 X10(3) UL (ref 1–4)
LYMPHOCYTES NFR BLD AUTO: 28.1 %
MCH RBC QN AUTO: 30.9 PG (ref 26–34)
MCHC RBC AUTO-ENTMCNC: 34.9 G/DL (ref 31–37)
MCV RBC AUTO: 88.5 FL
MICROALBUMIN UR-MCNC: 0.3 MG/DL
MICROALBUMIN/CREAT 24H UR-RTO: 2.7 UG/MG (ref ?–30)
MONOCYTES # BLD AUTO: 0.82 X10(3) UL (ref 0.1–1)
MONOCYTES NFR BLD AUTO: 10.9 %
NEUTROPHILS # BLD AUTO: 4.45 X10 (3) UL (ref 1.5–7.7)
NEUTROPHILS # BLD AUTO: 4.45 X10(3) UL (ref 1.5–7.7)
NEUTROPHILS NFR BLD AUTO: 59.1 %
NONHDLC SERPL-MCNC: 131 MG/DL (ref ?–130)
OSMOLALITY SERPL CALC.SUM OF ELEC: 288 MOSM/KG (ref 275–295)
PLATELET # BLD AUTO: 290 10(3)UL (ref 150–450)
POTASSIUM SERPL-SCNC: 4.1 MMOL/L (ref 3.5–5.1)
PROT SERPL-MCNC: 7.4 G/DL (ref 5.7–8.2)
RBC # BLD AUTO: 4.86 X10(6)UL
SODIUM SERPL-SCNC: 139 MMOL/L (ref 136–145)
TRIGL SERPL-MCNC: 185 MG/DL (ref 30–149)
TSI SER-ACNC: 1.08 MIU/ML (ref 0.55–4.78)
VIT D+METAB SERPL-MCNC: 29.7 NG/ML (ref 30–100)
VLDLC SERPL CALC-MCNC: 31 MG/DL (ref 0–30)
WBC # BLD AUTO: 7.5 X10(3) UL (ref 4–11)

## 2024-03-07 PROCEDURE — 80061 LIPID PANEL: CPT

## 2024-03-07 PROCEDURE — 83036 HEMOGLOBIN GLYCOSYLATED A1C: CPT

## 2024-03-07 PROCEDURE — 82043 UR ALBUMIN QUANTITATIVE: CPT

## 2024-03-07 PROCEDURE — 85025 COMPLETE CBC W/AUTO DIFF WBC: CPT

## 2024-03-07 PROCEDURE — 84443 ASSAY THYROID STIM HORMONE: CPT

## 2024-03-07 PROCEDURE — 82306 VITAMIN D 25 HYDROXY: CPT

## 2024-03-07 PROCEDURE — 82570 ASSAY OF URINE CREATININE: CPT

## 2024-03-07 PROCEDURE — 80053 COMPREHEN METABOLIC PANEL: CPT

## 2024-03-07 PROCEDURE — 36415 COLL VENOUS BLD VENIPUNCTURE: CPT

## 2024-03-07 RX ORDER — LISINOPRIL 20 MG/1
20 TABLET ORAL DAILY
Qty: 90 TABLET | Refills: 3 | Status: SHIPPED | OUTPATIENT
Start: 2024-03-07

## 2024-03-07 NOTE — TELEPHONE ENCOUNTER
Please review.  Protocol failed / Has no protocol.    Labs pended, appt 3/8/24     Requested Prescriptions   Pending Prescriptions Disp Refills    LISINOPRIL 20 MG Oral Tab [Pharmacy Med Name: LISINOPRIL 20 MG TABLET] 90 tablet 1     Sig: TAKE 1 TABLET BY MOUTH EVERY DAY       Hypertension Medications Protocol Failed - 3/6/2024 12:06 AM        Failed - CMP or BMP in past 12 months        Failed - EGFRCR or GFRNAA > 50     GFR Evaluation            Passed - Last BP reading less than 140/90     BP Readings from Last 1 Encounters:   12/08/23 124/82               Passed - In person appointment or virtual visit in the past 12 mos or appointment in next 3 mos     Recent Outpatient Visits              3 months ago Encounter for routine adult health examination without abnormal findings    [facility] [place] Nisa Kaplan DO    Office Visit    5 months ago Obesity (BMI 30.0-34.9)    [facility] [place] Nisa Kaplan,     Office Visit    8 months ago Obesity (BMI 30.0-34.9)    [facility] [place] Nisa Kaplan,     Office Visit    11 months ago Obesity, Class II, BMI 35-39.9    [facility] [place] Nisa Kaplan,     Office Visit    1 year ago Encounter for routine adult health examination without abnormal findings    [facility]    Office Visit          Future Appointments         Provider Department Appt Notes    Tomorrow Nisa Kaplan DO [facility]

## 2024-03-08 ENCOUNTER — PATIENT MESSAGE (OUTPATIENT)
Facility: CLINIC | Age: 64
End: 2024-03-08

## 2024-03-08 ENCOUNTER — TELEMEDICINE (OUTPATIENT)
Facility: CLINIC | Age: 64
End: 2024-03-08
Payer: COMMERCIAL

## 2024-03-08 DIAGNOSIS — E66.9 OBESITY (BMI 30.0-34.9): ICD-10-CM

## 2024-03-08 DIAGNOSIS — I10 PRIMARY HYPERTENSION: ICD-10-CM

## 2024-03-08 DIAGNOSIS — E55.9 VITAMIN D DEFICIENCY: ICD-10-CM

## 2024-03-08 DIAGNOSIS — E66.9 OBESITY (BMI 30.0-34.9): Primary | ICD-10-CM

## 2024-03-08 DIAGNOSIS — E03.9 HYPOTHYROIDISM, UNSPECIFIED TYPE: ICD-10-CM

## 2024-03-08 PROCEDURE — 99214 OFFICE O/P EST MOD 30 MIN: CPT | Performed by: FAMILY MEDICINE

## 2024-03-08 RX ORDER — TIRZEPATIDE 7.5 MG/.5ML
7.5 INJECTION, SOLUTION SUBCUTANEOUS WEEKLY
Qty: 2 ML | Refills: 0 | Status: SHIPPED | OUTPATIENT
Start: 2024-03-08 | End: 2024-03-10

## 2024-03-08 NOTE — PROGRESS NOTES
CC:    Chief Complaint   Patient presents with    Follow - Up     Follow up to her physical and go over labs       HPI: 64 year old female presenting for video visit to follow-up on Wegovy for weight loss and lab results.  She had lost significant weight on Wegovy, but has started to plateau and then gained five pounds recently.  It has not suppressed her appetite as much anymore, but has helped her maintain her eating habits as she feels salazar faster.   She was told she now needs a prior authorization for Wegovy, and may need a quantity limit as well.   Her A1C is now 5.4 with the medication, and it was 5.7 prior to starting Wegovy.   Her triglycerides did come back a little higher due to eating more burgers recently, and she has not been exercising as much anymore.   Denies any adverse side effects on this dose of Wegovy.     ROS:  General:  No fever, no fatigue, weight gain, normal appetite   HEENT:  Denies congestion or nasal discharge  Cardio:  No chest pain  Pulmonary:  No cough, no SOB  GI:  No N/V/D, no constipation   Dermatologic:  No rashes    Past Medical History:   Diagnosis Date    Allergic rhinitis     Anxiety     Diabetes (HCC)     diagnosed 2008    Essential hypertension     History of PCOS     Hypothyroidism     Obesity     Post-menopause 2011    started 10-10-18 , lasted approx 5 days    Thyroid disease     hypothyroidism       Social History     Socioeconomic History    Marital status: Single     Spouse name: Not on file    Number of children: Not on file    Years of education: Not on file    Highest education level: Not on file   Occupational History    Not on file   Tobacco Use    Smoking status: Never    Smokeless tobacco: Never   Vaping Use    Vaping Use: Never used   Substance and Sexual Activity    Alcohol use: Not Currently     Comment: rarely    Drug use: No    Sexual activity: Yes     Partners: Female   Other Topics Concern     Service Not Asked    Blood Transfusions No     Caffeine Concern No    Occupational Exposure Not Asked    Hobby Hazards Not Asked    Sleep Concern Not Asked    Stress Concern No    Weight Concern Yes    Special Diet No    Back Care Not Asked    Exercise No    Bike Helmet Not Asked    Seat Belt No    Self-Exams Not Asked   Social History Narrative    No H/O abuse      Social Determinants of Health     Financial Resource Strain: Not on file   Food Insecurity: Not on file   Transportation Needs: Not on file   Physical Activity: Not on file   Stress: Not on file   Social Connections: Not on file   Housing Stability: Not on file       Current Outpatient Medications   Medication Sig Dispense Refill    lisinopril 20 MG Oral Tab Take 1 tablet (20 mg total) by mouth daily. 90 tablet 3    ondansetron (ZOFRAN) 4 mg tablet Take 1 tablet (4 mg total) by mouth every 8 (eight) hours as needed for Nausea. 30 tablet 1    semaglutide-weight management 2.4 MG/0.75ML Subcutaneous Solution Auto-injector Inject 0.75 mL (2.4 mg total) into the skin once a week. 9 mL 1    levothyroxine 88 MCG Oral Tab Take 1 tablet (88 mcg total) by mouth before breakfast. 90 tablet 3    sertraline 50 MG Oral Tab Take 1 tablet (50 mg total) by mouth daily. 90 tablet 3    Esomeprazole Magnesium 20 MG Oral Capsule Delayed Release Take 1 capsule (20 mg total) by mouth before breakfast.         Penicillins and Sulfa antibiotics    Wt Readings from Last 6 Encounters:   12/08/23 195 lb (88.5 kg)   09/11/23 189 lb (85.7 kg)   06/19/23 203 lb (92.1 kg)   03/14/23 223 lb (101.2 kg)   12/07/22 218 lb (98.9 kg)   12/22/21 222 lb 12.8 oz (101.1 kg)         Physical:  General:  Alert, appropriate, no acute distress, A&O x 3  Pulmonary:  Speaking in clear and full sentences, no dyspnea   Psych: normal mood and affect     Assessment and Plan: 64 year old female presenting for video visit to follow-up on Wegovy for weight loss and lab results.     1. Obesity (BMI 30.0-34.9)    - Has lost 12% of her starting weight  since starting Wegovy last year, but has started to experience some weight re-gain recently as she is no longer feeling as much of the appetite suppression  - Will switch to Zepbound and start at 7.5 mg weekly given plateau with Wegovy, and can increase dose monthly as needed and tolerated     2. Primary hypertension    - Blood pressure very well controlled with weight loss and dietary improvements  - Continue Lisinopril 20 mg daily     3. Hypothyroidism, unspecified type    - Recent TSH normal, and will notify me when needing refills of Levothyroxine     4. Vitamin D deficiency    - Start vitamin d3 5,000 units daily     Please note that the following visit was completed using two-way, real-time interactive audio and video communication. This has been done in good alberto to provide continuity of care in the best interest of the provider-patient relationship, due to the ongoing public health crisis/national emergency and because of restrictions of visitation. There are limitations of this visit as no physical exam could be performed. Every conscious effort was taken to allow for sufficient and adequate time. This billing was spent on reviewing labs, medications, radiology tests and decision making. Appropriate medical decision-making and tests are ordered as detailed in the plan of care above.      Nisa Kaplan DO  03/08/24  10:51 AM     No complaints

## 2024-03-08 NOTE — TELEPHONE ENCOUNTER
Routed to Dr Kaplan for advise, thanks.  Pt had Telemed visit today 3-8-24.      No future appointments.

## 2024-03-10 ENCOUNTER — TELEPHONE (OUTPATIENT)
Facility: CLINIC | Age: 64
End: 2024-03-10

## 2024-03-10 DIAGNOSIS — E66.9 OBESITY (BMI 30.0-34.9): Primary | ICD-10-CM

## 2024-03-10 RX ORDER — TIRZEPATIDE 7.5 MG/.5ML
7.5 INJECTION, SOLUTION SUBCUTANEOUS WEEKLY
Qty: 2 ML | Refills: 0 | Status: SHIPPED | OUTPATIENT
Start: 2024-03-10

## 2024-03-10 NOTE — TELEPHONE ENCOUNTER
Change in pharmacy          Hi Dr Kaplan,  All [facility] are on backorder for Zepbound with no ETA. Can we try [facility]  [address], [place] [address]  Thank you!

## 2024-03-11 NOTE — TELEPHONE ENCOUNTER
Approved    Prior authorization approved Case ID: [id number]      Payer: [employer]    [phone]    [phone]   CaseId:[id number];Status:Approved;Review Type:Prior Auth;Coverage Start Date:02/09/2024;Coverage End Date:11/05/2024;   Approval Details    Authorized from February 9, 2024 to November 5, 2024      Electronic appeal: Not supported   View History

## 2024-03-19 ENCOUNTER — TELEPHONE (OUTPATIENT)
Facility: CLINIC | Age: 64
End: 2024-03-19

## 2024-03-19 NOTE — TELEPHONE ENCOUNTER
Miguel A Love,     I just sent the 2.4 mg of Wegovy with a quantity of 3 mL as that is what your insurance requested this time. Keep me posted!     Dr. Kaplan

## 2024-03-19 NOTE — TELEPHONE ENCOUNTER
Approved    Prior authorization approved Case ID: [id number]      Payer: [employer]    [phone]    [phone]   CaseId:[id number];Status:Approved;Review Type:Prior Auth;Coverage Start Date:02/18/2024;Coverage End Date:03/19/2025;   Approval Details    Authorized from February 18, 2024 to March 19, 2025      Electronic appeal: Not supported   View History

## 2024-03-19 NOTE — TELEPHONE ENCOUNTER
For prior auth please answer the following question:  Has the patient completed at least 7 months of therapy with the requested medication?

## 2024-03-19 NOTE — TELEPHONE ENCOUNTER
Regarding: FW: Zepbound  Contact: [phone]      ----- Message -----  From: Kate Lott  Sent: 3/18/2024   6:31 PM CDT  To: Em Triage Support  Subject: Isaac Kaplan,  The pharmacy won’t fill the script without a prior authorization so it’s on hold.   ThanksKate

## 2024-04-17 NOTE — TELEPHONE ENCOUNTER
Please Review. Protocol Failed; No Protocol     Requested Prescriptions   Pending Prescriptions Disp Refills    SERTRALINE 50 MG Oral Tab [Pharmacy Med Name: SERTRALINE HCL 50 MG TABLET] 90 tablet 3     Sig: TAKE 1 TABLET (50 MG TOTAL) BY MOUTH NOON.       Psychiatric Non-Scheduled (Anti-Anxiety) Failed - 4/16/2024  8:49 AM        Failed - Depression Screening completed within the past 12 months        Passed - In person appointment or virtual visit in the past 6 mos or appointment in next 3 mos     Recent Outpatient Visits              1 month ago Obesity (BMI 30.0-34.9)    [facility]     [facility]    4 months ago Encounter for routine adult health examination without abnormal findings    [facility] Nisa Kaplan, DO    Office Visit    7 months ago Obesity (BMI 30.0-34.9)    [facility] Nisa Kaplan, DO    Office Visit    10 months ago Obesity (BMI 30.0-34.9)    [facility]    Office Visit    1 year ago Obesity, Class II, BMI 35-39.9    [facility] Nisa Kaplan, DO    Office Visit                               Recent Outpatient Visits              1 month ago Obesity (BMI 30.0-34.9)    [facility]    Telemedicine    4 months ago Encounter for routine adult health examination without abnormal findings    [facility] Nisa Kaplan, DO    Office Visit    7 months ago Obesity (BMI 30.0-34.9)    [facility]    Office Visit    10 months ago Obesity (BMI 30.0-34.9)    [facility]    [facility] Visit    1 year ago Obesity, Class II, BMI 35-39.9    [facility] [place] Nisa Kaplan, DO    Office Visit

## 2024-06-20 DIAGNOSIS — I10 PRIMARY HYPERTENSION: ICD-10-CM

## 2024-06-20 DIAGNOSIS — E66.9 OBESITY (BMI 30.0-34.9): ICD-10-CM

## 2024-06-23 NOTE — TELEPHONE ENCOUNTER
Please review. Protocol failed / No protocol.    Requested Prescriptions   Pending Prescriptions Disp Refills    semaglutide-weight management 2.4 MG/0.75ML Subcutaneous Solution Auto-injector 3 mL 2     Sig: Inject 0.75 mL (2.4 mg total) into the skin once a week.       There is no refill protocol information for this order          Recent Outpatient Visits              3 months ago Obesity (BMI 30.0-34.9)    [facility],     Telemedicine    6 months ago Encounter for routine adult health examination without abnormal findings    [facility] [place],     Office Visit    9 months ago Obesity (BMI 30.0-34.9)    [facility]     [facility]    1 year ago Obesity (BMI 30.0-34.9)    [facility] [place],     Office Visit    1 year ago Obesity, Class II, BMI 35-39.9    [facility]     [facility]

## 2024-09-02 RX ORDER — LEVOTHYROXINE SODIUM 88 UG/1
88 TABLET ORAL
Qty: 90 TABLET | Refills: 3 | Status: SHIPPED | OUTPATIENT
Start: 2024-09-02

## 2024-09-02 NOTE — TELEPHONE ENCOUNTER
Refill Per Protocol     Requested Prescriptions   Pending Prescriptions Disp Refills    LEVOTHYROXINE 88 MCG Oral Tab [Pharmacy Med Name: LEVOTHYROXINE 88 MCG TABLET] 90 tablet 3     Sig: TAKE 1 TABLET BY MOUTH BEFORE BREAKFAST.       Thyroid Medication Protocol Passed - 8/30/2024 12:10 AM        Passed - TSH in past 12 months        Passed - Last TSH value is normal     Lab Results   Component Value Date    TSH 1.077 03/07/2024                 Passed - In person appointment or virtual visit in the past 12 mos or appointment in next 3 mos     Recent Outpatient Visits              5 months ago Obesity (BMI 30.0-34.9)    [facility] [place],     Telemedicine    8 months ago Encounter for routine adult health examination without abnormal findings    [facility]    Office Visit    11 months ago Obesity (BMI 30.0-34.9)    [facility] [place]    Office Visit    1 year ago Obesity (BMI 30.0-34.9)    [facility] [place]    Office Visit    1 year ago Obesity, Class II, BMI 35-39.9    [facility] Nisa Kaplan, DO    Office Visit                               Recent Outpatient Visits              5 months ago Obesity (BMI 30.0-34.9)    [facility] [place]    Telemedicine    8 months ago Encounter for routine adult health examination without abnormal findings    [facility] [place]    Office Visit    11 months ago Obesity (BMI 30.0-34.9)    [facility] [place]    Office Visit    1 year ago Obesity (BMI 30.0-34.9)    [facility] [place]    Office Visit    1 year ago Obesity, Class II, BMI  35-39.9    [facility], [facility] Nisa Kaplan,     Office Visit

## 2024-11-29 ENCOUNTER — HOSPITAL ENCOUNTER (OUTPATIENT)
Dept: MAMMOGRAPHY | Facility: HOSPITAL | Age: 64
Discharge: HOME | End: 2024-11-29
Attending: FAMILY MEDICINE
Payer: COMMERCIAL

## 2024-11-29 DIAGNOSIS — Z12.31 ENCOUNTER FOR SCREENING MAMMOGRAM FOR MALIGNANT NEOPLASM OF BREAST: ICD-10-CM

## 2024-11-29 PROCEDURE — 77067 SCR MAMMO BI INCL CAD: CPT | Performed by: FAMILY MEDICINE

## 2024-11-29 PROCEDURE — 77063 BREAST TOMOSYNTHESIS BI: CPT | Performed by: FAMILY MEDICINE

## 2024-12-31 ENCOUNTER — OFFICE VISIT (OUTPATIENT)
Facility: CLINIC | Age: 64
End: 2024-12-31
Payer: COMMERCIAL

## 2024-12-31 ENCOUNTER — LAB ENCOUNTER (OUTPATIENT)
Dept: LAB | Age: 64
End: 2024-12-31
Attending: FAMILY MEDICINE
Payer: COMMERCIAL

## 2024-12-31 ENCOUNTER — TELEPHONE (OUTPATIENT)
Facility: CLINIC | Age: 64
End: 2024-12-31

## 2024-12-31 VITALS
SYSTOLIC BLOOD PRESSURE: 126 MMHG | BODY MASS INDEX: 35.17 KG/M2 | HEART RATE: 83 BPM | DIASTOLIC BLOOD PRESSURE: 82 MMHG | HEIGHT: 64 IN | WEIGHT: 206 LBS | OXYGEN SATURATION: 97 %

## 2024-12-31 DIAGNOSIS — E66.811 OBESITY (BMI 30.0-34.9): ICD-10-CM

## 2024-12-31 DIAGNOSIS — Z00.00 ENCOUNTER FOR GENERAL ADULT MEDICAL EXAMINATION W/O ABNORMAL FINDINGS: ICD-10-CM

## 2024-12-31 DIAGNOSIS — Z23 NEED FOR VACCINATION: ICD-10-CM

## 2024-12-31 DIAGNOSIS — E03.9 HYPOTHYROIDISM, UNSPECIFIED TYPE: ICD-10-CM

## 2024-12-31 DIAGNOSIS — Z13.820 SCREENING FOR OSTEOPOROSIS: ICD-10-CM

## 2024-12-31 DIAGNOSIS — I10 PRIMARY HYPERTENSION: ICD-10-CM

## 2024-12-31 DIAGNOSIS — Z00.00 ENCOUNTER FOR ROUTINE ADULT HEALTH EXAMINATION WITHOUT ABNORMAL FINDINGS: Primary | ICD-10-CM

## 2024-12-31 DIAGNOSIS — E55.9 VITAMIN D DEFICIENCY: ICD-10-CM

## 2024-12-31 DIAGNOSIS — R73.03 PREDIABETES: ICD-10-CM

## 2024-12-31 LAB
ALBUMIN SERPL-MCNC: 4.5 G/DL (ref 3.2–4.8)
ALBUMIN/GLOB SERPL: 1.7 {RATIO} (ref 1–2)
ALP LIVER SERPL-CCNC: 77 U/L
ALT SERPL-CCNC: 22 U/L
ANION GAP SERPL CALC-SCNC: 7 MMOL/L (ref 0–18)
AST SERPL-CCNC: 25 U/L (ref ?–34)
BILIRUB SERPL-MCNC: 1 MG/DL (ref 0.2–1.1)
BUN BLD-MCNC: 15 MG/DL (ref 9–23)
BUN/CREAT SERPL: 13.9 (ref 10–20)
CALCIUM BLD-MCNC: 9.9 MG/DL (ref 8.7–10.4)
CHLORIDE SERPL-SCNC: 104 MMOL/L (ref 98–112)
CHOLEST SERPL-MCNC: 170 MG/DL (ref ?–200)
CO2 SERPL-SCNC: 29 MMOL/L (ref 21–32)
CREAT BLD-MCNC: 1.08 MG/DL
CREAT UR-SCNC: 160.8 MG/DL
EGFRCR SERPLBLD CKD-EPI 2021: 57 ML/MIN/1.73M2 (ref 60–?)
EST. AVERAGE GLUCOSE BLD GHB EST-MCNC: 111 MG/DL (ref 68–126)
FASTING PATIENT LIPID ANSWER: YES
FASTING STATUS PATIENT QL REPORTED: YES
GLOBULIN PLAS-MCNC: 2.6 G/DL (ref 2–3.5)
GLUCOSE BLD-MCNC: 87 MG/DL (ref 70–99)
HBA1C MFR BLD: 5.5 % (ref ?–5.7)
HDLC SERPL-MCNC: 40 MG/DL (ref 40–59)
LDLC SERPL CALC-MCNC: 102 MG/DL (ref ?–100)
MICROALBUMIN UR-MCNC: <0.3 MG/DL
NONHDLC SERPL-MCNC: 130 MG/DL (ref ?–130)
OSMOLALITY SERPL CALC.SUM OF ELEC: 290 MOSM/KG (ref 275–295)
POTASSIUM SERPL-SCNC: 4.1 MMOL/L (ref 3.5–5.1)
PROT SERPL-MCNC: 7.1 G/DL (ref 5.7–8.2)
SODIUM SERPL-SCNC: 140 MMOL/L (ref 136–145)
TRIGL SERPL-MCNC: 161 MG/DL (ref 30–149)
TSI SER-ACNC: 2.29 UIU/ML (ref 0.55–4.78)
VIT D+METAB SERPL-MCNC: 53.5 NG/ML (ref 30–100)
VLDLC SERPL CALC-MCNC: 27 MG/DL (ref 0–30)

## 2024-12-31 PROCEDURE — 84443 ASSAY THYROID STIM HORMONE: CPT

## 2024-12-31 PROCEDURE — 80061 LIPID PANEL: CPT

## 2024-12-31 PROCEDURE — 80053 COMPREHEN METABOLIC PANEL: CPT

## 2024-12-31 PROCEDURE — 82043 UR ALBUMIN QUANTITATIVE: CPT

## 2024-12-31 PROCEDURE — 83036 HEMOGLOBIN GLYCOSYLATED A1C: CPT

## 2024-12-31 PROCEDURE — 99213 OFFICE O/P EST LOW 20 MIN: CPT | Performed by: FAMILY MEDICINE

## 2024-12-31 PROCEDURE — 99396 PREV VISIT EST AGE 40-64: CPT | Performed by: FAMILY MEDICINE

## 2024-12-31 PROCEDURE — 82306 VITAMIN D 25 HYDROXY: CPT

## 2024-12-31 PROCEDURE — 36415 COLL VENOUS BLD VENIPUNCTURE: CPT

## 2024-12-31 PROCEDURE — 90677 PCV20 VACCINE IM: CPT | Performed by: FAMILY MEDICINE

## 2024-12-31 PROCEDURE — 90471 IMMUNIZATION ADMIN: CPT | Performed by: FAMILY MEDICINE

## 2024-12-31 PROCEDURE — 82570 ASSAY OF URINE CREATININE: CPT

## 2024-12-31 RX ORDER — TIRZEPATIDE 10 MG/.5ML
10 INJECTION, SOLUTION SUBCUTANEOUS WEEKLY
Qty: 2 ML | Refills: 0 | Status: SHIPPED | OUTPATIENT
Start: 2024-12-31

## 2024-12-31 NOTE — PROGRESS NOTES
HPI:   Kate Lott is a 64 year old female who presents for a complete physical exam.   Chief Complaint   Patient presents with    Physical    Medication Request     Wants to be prescribed Zepbound now.     Was able to lose weight on Wegovy for awhile, but has been gaining it back over the last 4-5 months. The food noise has returned, and not feeling as much appetite suppression. She is also no longer having any nausea. She would like to switch to Zepbound to see if it works better.     Last pap: 1/2020 and normal   Last mammogram: 11/2024   Previous colonoscopy: Negative Cologuard 1/2023   History of STD's: None  History of intimate partner violence: None  Family hx of breast, ovarian, cervical or colon CA: None  Diet and exercise: Has not been exercising regularly. Does not eat a lot of protein, and eating too many carbohydrates, fat, and sugar. Plans to stop eating out as much.   Immunizations:  Tdap: 2015, Flu: 10/2024, Pneumo: Has not had it, Shingles: Completed, Covid: Completed    REVIEW OF SYSTEMS:   GENERAL: feels well otherwise   SKIN: denies any unusual skin lesions  EYES: no vision problems  BREAST: no lumps or masses, no nipple discharge   LUNGS: denies shortness of breath  CARDIOVASCULAR: denies chest pain  GI: denies abdominal pain,  No constipation or diarrhea, no hematochezia  : denies dysuria, vaginal discharge or itching  NEURO: denies headaches  PSYCHE: denies depression and stable anxiety          Current Outpatient Medications   Medication Sig Dispense Refill    Tirzepatide-Weight Management (ZEPBOUND) 10 MG/0.5ML Subcutaneous Solution Auto-injector Inject 10 mg into the skin once a week. 2 mL 0    levothyroxine 88 MCG Oral Tab Take 1 tablet (88 mcg total) by mouth before breakfast. 90 tablet 3    sertraline 50 MG Oral Tab Take 1 tablet (50 mg total) by mouth Noon. 90 tablet 3    Cholecalciferol 125 MCG (5000 UT) Oral Tab Take 1 tablet (5,000 Units total) by mouth daily.      lisinopril  20 MG Oral Tab Take 1 tablet (20 mg total) by mouth daily. 90 tablet 3    ondansetron (ZOFRAN) 4 mg tablet Take 1 tablet (4 mg total) by mouth every 8 (eight) hours as needed for Nausea. 30 tablet 1    Esomeprazole Magnesium 20 MG Oral Capsule Delayed Release Take 1 capsule (20 mg total) by mouth before breakfast.       Allergies[1]   Past Medical History:    Allergic rhinitis    Anxiety    Diabetes (HCC)    diagnosed 2008    Essential hypertension    History of PCOS    Hypothyroidism    Obesity    Post-menopause    started 10-10-18 , lasted approx 5 days    Thyroid disease    hypothyroidism      Past Surgical History:   Procedure Laterality Date    Colonoscopy      Colonoscopy      D & c      Hysteroscopy,diagnostic  11/06/2018    Diagnostic hysteroscopy with dilation and curettage.    Willy biopsy stereo nodule 1 site right (cpt=19081) Right 11/14/2023    buckle clip    Willy localization wire 1 site left (cpt=19281)        Family History   Problem Relation Age of Onset    Hypertension Mother     Cancer Paternal Grandmother         Lung cancer     Hypertension Brother       Social History:   Social History     Socioeconomic History    Marital status: Single   Tobacco Use    Smoking status: Never    Smokeless tobacco: Never   Vaping Use    Vaping status: Never Used   Substance and Sexual Activity    Alcohol use: Not Currently     Comment: rarely    Drug use: No    Sexual activity: Yes     Partners: Female   Other Topics Concern    Blood Transfusions No    Caffeine Concern No    Stress Concern No    Weight Concern Yes    Special Diet No    Exercise No    Seat Belt No   Social History Narrative    No H/O abuse      Occ:  for DANIEL. : Domestic partner, [employer]. Children: 3 step-children.         EXAM:     Wt Readings from Last 6 Encounters:   12/31/24 206 lb (93.4 kg)   12/08/23 195 lb (88.5 kg)   09/11/23 189 lb (85.7 kg)   06/19/23 203 lb (92.1 kg)   03/14/23 223 lb (101.2 kg)   12/07/22 218 lb (98.9  kg)     Body mass index is 35.36 kg/m².   /82   Pulse 83   Ht 5' 4\" (1.626 m)   Wt 206 lb (93.4 kg)   LMP 10/10/2011 (Approximate)   SpO2 97%   BMI 35.36 kg/m²     GENERAL: well developed, well nourished, in no apparent distress   SKIN: no rashes, no suspicious lesions  HEENT: atraumatic, normocephalic, throat clear; normal dentition  EYES: PERRLA, EOMI, conjunctiva are clear  NECK: supple, no adenopathy or thyroid masses   BREAST: deferred  LUNGS: clear to auscultation  CARDIO: RRR without murmur  GI: good bowel sounds, no masses, HSM or tenderness  : deferred, plans to see gynecology   EXTREMITIES: no edema    Cholesterol, Total (mg/dL)   Date Value   03/07/2024 171   12/08/2022 152   12/22/2021 168   07/17/2020 138     HDL Cholesterol (mg/dL)   Date Value   03/07/2024 40   12/08/2022 37 (L)   12/22/2021 41   07/17/2020 37 (L)     LDL Cholesterol (mg/dL)   Date Value   03/07/2024 99   12/08/2022 84   12/22/2021 98   07/17/2020 76      ASSESSMENT AND PLAN:   Kate Lott is a 64 year old female who presents for a complete physical exam.  Encounter Diagnoses   Name Primary?    Encounter for routine adult health examination without abnormal findings Yes    Obesity (BMI 30.0-34.9)     Primary hypertension     Prediabetes     Hypothyroidism, unspecified type     Screening for osteoporosis     Need for vaccination      Orders Placed This Encounter   Procedures    Prevnar 20 (PCV20) [36958]     Patient was doing very well on Wegovy 2.4 mg weekly until the summer when she no longer felt the effects of the medication and started to re-gain weight so will switch to Zepbound 10 mg weekly.  Plan to increase dose as needed and tolerated, and will schedule follow-up visit in 3 months.  Blood pressure still well-controlled on current regimen.  Continue healthy diet and exercise for prediabetes, and will check labs today.  Continue levothyroxine for hypothyroidism and check TSH today to ensure levels  stable.    Discussed with patient the following:  -Monthly breast self-exam  -Breast cancer screening/mammograms and clinical breast exams  -Cervical cancer screening/pap smears  -Colon cancer screening/colonoscopy  -Adequate calcium and Vitamin D intake to prevent osteoporosis  -Bone density screening for osteopenia/osteoporosis  -Healthy diet including adequate intake of vegetables and fruits, appropriate portion sizes, minimizing highly concentrated carbohydrate foods  -Exercising 30 minutes a day most days of the week   -Diabetes screening   -Cholesterol screening   -Recommendation for yearly influenza vaccine  -Need for Tdap once as an adult and Td booster every 10 years  -Need for Zoster vaccine for patients >= 50  -STI screening (GC/Chlamydia/HIV): Not indicated   -Hepatitis C screening for all adults between the ages of 18 and 79: Checked and negative       All questions were answered during the visit and the patient verbalizes understanding. She will return in 3 months for medication follow-up, one year for next WWE or sooner as needed.    Meds & Refills for this Visit:  Requested Prescriptions     Signed Prescriptions Disp Refills    Tirzepatide-Weight Management (ZEPBOUND) 10 MG/0.5ML Subcutaneous Solution Auto-injector 2 mL 0     Sig: Inject 10 mg into the skin once a week.       Imaging & Consults:  PCV20 VACCINE FOR INTRAMUSCULAR USE  XR DEXA BONE DENSITOMETRY (WWB=35434)    Nisa Kaplan DO  12/31/2024  9:13 AM       [1]   Allergies  Allergen Reactions    Penicillins HIVES    Sulfa Antibiotics OTHER (SEE COMMENTS) and HIVES     She states she was informed by a physician of possible reaction

## 2024-12-31 NOTE — TELEPHONE ENCOUNTER
Approved    Prior authorization approved  Payer: [employer] Case ID: [id number]    [phone]    [phone]  Note from payer: CaseId:[id number];Status:Approved;Review Type:Prior Auth;Coverage Start Date:12/01/2024;Coverage End Date:08/28/2025;  Approval Details    Authorized from December 1, 2024 to August 28, 2025    Patient notified via [employer].

## 2025-01-02 DIAGNOSIS — R79.89 ELEVATED SERUM CREATININE: Primary | ICD-10-CM

## 2025-01-20 ENCOUNTER — HOSPITAL ENCOUNTER (OUTPATIENT)
Dept: BONE DENSITY | Age: 65
Discharge: HOME | End: 2025-01-20
Attending: FAMILY MEDICINE
Payer: COMMERCIAL

## 2025-01-20 DIAGNOSIS — Z13.820 SCREENING FOR OSTEOPOROSIS: ICD-10-CM

## 2025-01-20 PROCEDURE — 77080 DXA BONE DENSITY AXIAL: CPT | Performed by: FAMILY MEDICINE

## 2025-02-17 DIAGNOSIS — I10 PRIMARY HYPERTENSION: ICD-10-CM

## 2025-02-17 DIAGNOSIS — R73.03 PREDIABETES: ICD-10-CM

## 2025-02-17 DIAGNOSIS — E66.811 OBESITY (BMI 30.0-34.9): ICD-10-CM

## 2025-02-21 RX ORDER — TIRZEPATIDE 12.5 MG/.5ML
12.5 INJECTION, SOLUTION SUBCUTANEOUS WEEKLY
Qty: 2 ML | Refills: 0 | Status: SHIPPED | OUTPATIENT
Start: 2025-02-21

## 2025-02-26 RX ORDER — LISINOPRIL 20 MG/1
20 TABLET ORAL DAILY
Qty: 90 TABLET | Refills: 3 | Status: SHIPPED | OUTPATIENT
Start: 2025-02-26

## 2025-03-18 ENCOUNTER — PATIENT MESSAGE (OUTPATIENT)
Facility: CLINIC | Age: 65
End: 2025-03-18

## 2025-03-18 DIAGNOSIS — R73.03 PREDIABETES: ICD-10-CM

## 2025-03-18 DIAGNOSIS — I10 PRIMARY HYPERTENSION: ICD-10-CM

## 2025-03-18 DIAGNOSIS — E66.811 OBESITY (BMI 30.0-34.9): Primary | ICD-10-CM

## 2025-03-18 NOTE — TELEPHONE ENCOUNTER
[employer] message sent to patient, await reply.   Last office visit 12-31-24 due for 3 mos f/u.   Rx pended.  No future appointments.      Requested Prescriptions     Pending Prescriptions Disp Refills    Tirzepatide-Weight Management (ZEPBOUND) 15 MG/0.5ML Subcutaneous Solution Auto-injector 2 mL 0     Sig: Inject 15 mg into the skin once a week for 4 doses.       Last Office Visit with PCP: 12/31/2024

## 2025-03-19 RX ORDER — TIRZEPATIDE 15 MG/.5ML
15 INJECTION, SOLUTION SUBCUTANEOUS WEEKLY
Qty: 6 ML | Refills: 0 | Status: SHIPPED | OUTPATIENT
Start: 2025-03-19

## 2025-03-19 NOTE — TELEPHONE ENCOUNTER
No future appointments.    Dr. Kaplan - please review [employer] message and advise. Please respond directly to the patient if no additional staff support is required.

## 2025-04-07 ENCOUNTER — LAB ENCOUNTER (OUTPATIENT)
Dept: LAB | Age: 65
End: 2025-04-07
Attending: FAMILY MEDICINE
Payer: COMMERCIAL

## 2025-04-07 DIAGNOSIS — R79.89 ELEVATED SERUM CREATININE: ICD-10-CM

## 2025-04-07 LAB
ANION GAP SERPL CALC-SCNC: 9 MMOL/L (ref 0–18)
BUN BLD-MCNC: 16 MG/DL (ref 9–23)
BUN/CREAT SERPL: 14.5 (ref 10–20)
CALCIUM BLD-MCNC: 9.5 MG/DL (ref 8.7–10.4)
CHLORIDE SERPL-SCNC: 101 MMOL/L (ref 98–112)
CO2 SERPL-SCNC: 29 MMOL/L (ref 21–32)
CREAT BLD-MCNC: 1.1 MG/DL
EGFRCR SERPLBLD CKD-EPI 2021: 56 ML/MIN/1.73M2 (ref 60–?)
FASTING STATUS PATIENT QL REPORTED: YES
GLUCOSE BLD-MCNC: 87 MG/DL (ref 70–99)
OSMOLALITY SERPL CALC.SUM OF ELEC: 289 MOSM/KG (ref 275–295)
POTASSIUM SERPL-SCNC: 4.3 MMOL/L (ref 3.5–5.1)
SODIUM SERPL-SCNC: 139 MMOL/L (ref 136–145)

## 2025-04-07 PROCEDURE — 36415 COLL VENOUS BLD VENIPUNCTURE: CPT

## 2025-04-07 PROCEDURE — 80048 BASIC METABOLIC PNL TOTAL CA: CPT

## 2025-05-22 NOTE — TELEPHONE ENCOUNTER
Refill passed per Clinic protocol.  Requested Prescriptions   Pending Prescriptions Disp Refills    SERTRALINE 50 MG Oral Tab [Pharmacy Med Name: SERTRALINE HCL 50 MG TABLET] 90 tablet 3     Sig: Take 1 tablet (50 mg total) by mouth Noon.       Psychiatric Non-Scheduled (Anti-Anxiety) Passed - 5/22/2025  8:52 AM        Passed - In person appointment or virtual visit in the past 6 mos or appointment in next 3 mos     Recent Outpatient Visits              4 months ago Encounter for routine adult health examination without abnormal findings    [facility]    Office Visit    1 year ago Obesity (BMI 30.0-34.9)    [facility] Nisa Kaplan DO    Telemedicine    1 year ago Encounter for routine adult health examination without abnormal findings    [facility]    Office Visit    1 year ago Obesity (BMI 30.0-34.9)    [facility]    Office Visit    1 year ago Obesity (BMI 30.0-34.9)    [facility]    Office Visit          Future Appointments         Provider Department Appt Notes    In 1 month Nisa Kaplan DO [facility] Lab results                    Passed - Depression Screening completed within the past 12 months        Passed - Medication is active on med list

## 2025-06-13 DIAGNOSIS — R73.03 PREDIABETES: ICD-10-CM

## 2025-06-13 DIAGNOSIS — I10 PRIMARY HYPERTENSION: ICD-10-CM

## 2025-06-13 DIAGNOSIS — E66.811 OBESITY (BMI 30.0-34.9): ICD-10-CM

## 2025-06-16 DIAGNOSIS — E66.811 OBESITY (BMI 30.0-34.9): ICD-10-CM

## 2025-06-16 DIAGNOSIS — I10 PRIMARY HYPERTENSION: ICD-10-CM

## 2025-06-16 DIAGNOSIS — R73.03 PREDIABETES: ICD-10-CM

## 2025-06-16 RX ORDER — TIRZEPATIDE 15 MG/.5ML
INJECTION, SOLUTION SUBCUTANEOUS
Qty: 6 ML | Refills: 0 | OUTPATIENT
Start: 2025-06-16

## 2025-06-17 RX ORDER — TIRZEPATIDE 15 MG/.5ML
15 INJECTION, SOLUTION SUBCUTANEOUS WEEKLY
Qty: 6 ML | Refills: 1 | Status: SHIPPED | OUTPATIENT
Start: 2025-06-17

## 2025-06-17 NOTE — TELEPHONE ENCOUNTER
Please review.  Protocol failed / Has no protocol.     Which GLP is the patient on: Zepbound  Current dose: 15mg  Patient seeking refill  How many doses of current dose completed: 12  Side effects, if any: none  Current weight / how much weight loss: \"around 190\"  Last visit: 12/31/24    Future Appointments  Date Type Provider Dept   07/09/25 Appointment Nisa Kaplan DO [facility]       Requested Prescriptions   Pending Prescriptions Disp Refills    Tirzepatide-Weight Management (ZEPBOUND) 15 MG/0.5ML Subcutaneous Solution Auto-injector 6 mL 0     Sig: Inject 15 mg into the skin once a week.       There is no refill protocol information for this order

## 2025-06-17 NOTE — TELEPHONE ENCOUNTER
Patient called, verified Name and . States prescription for Zepbound was sent today. She spoke to insurance and she will be billed $1000 for one-month supply, and only $200 if she gets a script for 90-day supply.    Chart reviewed. Prescription is written with quantity for 90-day supply. Called [facility] and information was confirmed. Pharmacy technician states it was on default one-month supply. It was changed to 90-day supply and went through insurance with $200 cost as discussed earlier. She will order this for the patient.    Patient updated and is appreciative. No further questions or concerns at this time.

## 2025-07-08 ENCOUNTER — LAB ENCOUNTER (OUTPATIENT)
Dept: LAB | Age: 65
End: 2025-07-08
Attending: FAMILY MEDICINE
Payer: COMMERCIAL

## 2025-07-08 DIAGNOSIS — R79.89 ELEVATED SERUM CREATININE: ICD-10-CM

## 2025-07-08 LAB
ANION GAP SERPL CALC-SCNC: 8 MMOL/L (ref 0–18)
BUN BLD-MCNC: 15 MG/DL (ref 9–23)
BUN/CREAT SERPL: 14.4 (ref 10–20)
CALCIUM BLD-MCNC: 9.1 MG/DL (ref 8.7–10.4)
CHLORIDE SERPL-SCNC: 102 MMOL/L (ref 98–112)
CO2 SERPL-SCNC: 30 MMOL/L (ref 21–32)
CREAT BLD-MCNC: 1.04 MG/DL (ref 0.55–1.02)
EGFRCR SERPLBLD CKD-EPI 2021: 60 ML/MIN/1.73M2 (ref 60–?)
FASTING STATUS PATIENT QL REPORTED: YES
GLUCOSE BLD-MCNC: 92 MG/DL (ref 70–99)
OSMOLALITY SERPL CALC.SUM OF ELEC: 290 MOSM/KG (ref 275–295)
POTASSIUM SERPL-SCNC: 4.1 MMOL/L (ref 3.5–5.1)
SODIUM SERPL-SCNC: 140 MMOL/L (ref 136–145)

## 2025-07-08 PROCEDURE — 36415 COLL VENOUS BLD VENIPUNCTURE: CPT

## 2025-07-08 PROCEDURE — 80048 BASIC METABOLIC PNL TOTAL CA: CPT

## 2025-07-09 ENCOUNTER — TELEMEDICINE (OUTPATIENT)
Facility: CLINIC | Age: 65
End: 2025-07-09
Payer: COMMERCIAL

## 2025-07-09 VITALS — BODY MASS INDEX: 33.8 KG/M2 | HEIGHT: 64 IN | WEIGHT: 198 LBS

## 2025-07-09 DIAGNOSIS — I10 PRIMARY HYPERTENSION: ICD-10-CM

## 2025-07-09 DIAGNOSIS — R79.89 ELEVATED SERUM CREATININE: ICD-10-CM

## 2025-07-09 DIAGNOSIS — Z00.00 ROUTINE GENERAL MEDICAL EXAMINATION AT A HEALTH CARE FACILITY: ICD-10-CM

## 2025-07-09 DIAGNOSIS — F41.9 ANXIETY: ICD-10-CM

## 2025-07-09 DIAGNOSIS — E66.811 OBESITY (BMI 30.0-34.9): Primary | ICD-10-CM

## 2025-07-09 DIAGNOSIS — E03.9 HYPOTHYROIDISM, UNSPECIFIED TYPE: ICD-10-CM

## 2025-07-09 PROCEDURE — 98006 SYNCH AUDIO-VIDEO EST MOD 30: CPT | Performed by: FAMILY MEDICINE

## 2025-07-09 NOTE — PROGRESS NOTES
CC:    Chief Complaint   Patient presents with    Lab Results       HPI: 65 year old female presenting for video visit to discuss lab results and Zepbound for weight management.  Switched to Zepbound in December.   Has not had any side effects with the medication, but sometimes she has pain with the injections.   Has been on the 15 mg dose for several months.   She does feel salazar faster on the medication, but she still has food noise.   She is eating less, but she still craves sweets.   Stress makes her eat more.  When she walks more regularly she eats better and feels better.   Walking and exercising also helps her anxiety.   She drinks about 35 ounces of water a day.   Does not take NSAID medications or creatine.   Her blood pressure has been well controlled overall.     ROS:  General:  No fever, no fatigue, no weight changes, decreased appetite  HEENT:  Denies congestion or nasal discharge  GI:  No N/V/D, no constipation, no abdominal pain  :  No discharge, no dysuria, no polyuria, no hematuria  Dermatologic:  No rashes  Psych: stable anxiety and no depression     Past Medical History[1]    Social History     Socioeconomic History    Marital status: Single     Spouse name: Not on file    Number of children: Not on file    Years of education: Not on file    Highest education level: Not on file   Occupational History    Not on file   Tobacco Use    Smoking status: Never    Smokeless tobacco: Never   Vaping Use    Vaping status: Never Used   Substance and Sexual Activity    Alcohol use: Not Currently     Comment: rarely    Drug use: No    Sexual activity: Yes     Partners: Female   Other Topics Concern     Service Not Asked    Blood Transfusions No    Caffeine Concern No    Occupational Exposure Not Asked    Hobby Hazards Not Asked    Sleep Concern Not Asked    Stress Concern No    Weight Concern Yes    Special Diet No    Back Care Not Asked    Exercise No    Bike Helmet Not Asked    Seat Belt No     Self-Exams Not Asked   Social History Narrative    No H/O abuse      Social Drivers of Health     Food Insecurity: Not on file   Transportation Needs: Not on file   Stress: Not on file   Housing Stability: Not on file       Current Medications[2]    Penicillins and Sulfa antibiotics      Vitals:   Vitals:    07/09/25 1346   Weight: 198 lb (89.8 kg)   Height: 5' 4\" (1.626 m)     Body mass index is 33.99 kg/m².      Physical:  General:  Alert, appropriate, no acute distress, A&O x 3  Pulmonary:  Speaking in clear and full sentences, no dyspnea   Psych: normal mood and affect     Assessment and Plan: 65-year-old female presenting for video visit to follow-up on Zepbound for weight loss and other chronic conditions.    1. Obesity (BMI 30.0-34.9)    - Doing well on Zepbound 15 mg weekly with continued weight loss, and will continue on this dose with noted 11% weight loss overall since starting GLP-1 agonist medications  - Will continue to try to incorporate walking and exercise more as that helps her reduce anxiety and stress eating, and consider increasing sertraline if needed  - No personal history of pancreatitis and no personal or family history of medullary thyroid cancer or MEN syndrome Type 2   - Follow-up at physical in December     2. Primary hypertension    - Will continue to monitor at home and ensure it is well-controlled on lisinopril  - Microalb/Creat Ratio, Random Urine [E]; Future    3. Elevated serum creatinine    - Improved, and note GFR is now within normal limits as well  - Will increase water intake and continue to avoid NSAIDs  - Also stressed importance of controlling her blood pressure    4. Anxiety    - Continue sertraline 50 mg daily, and consider increasing if needed    5. Hypothyroidism, unspecified type    - Continue levothyroxine and repeat TSH prior to physical  - TSH W Reflex To Free T4 [E]; Future    6. Routine general medical examination at a health care facility    - Check labs prior to  physical   - Lipid Panel [E]; Future  - Comp Metabolic Panel (14) [E]; Future  - Hemoglobin A1C (Glycohemoglobin) [E]; Future  - CBC W Differential W Platelet [E]; Future    Please note that the following visit was completed using two-way, real-time interactive audio and video communication. This has been done in good alberto to provide continuity of care in the best interest of the provider-patient relationship, due to the ongoing public health crisis/national emergency and because of restrictions of visitation. There are limitations of this visit as no physical exam could be performed. Every conscious effort was taken to allow for sufficient and adequate time. This billing was spent on reviewing labs, medications, radiology tests and decision making. Appropriate medical decision-making and tests are ordered as detailed in the plan of care above.      Nisa Kaplan DO  07/09/25  1:40 PM         [1]   Past Medical History:   Allergic rhinitis    Anxiety    Diabetes (HCC)    diagnosed 2008    Essential hypertension    History of PCOS    Hypothyroidism    Obesity    Post-menopause    started 10-10-18 , lasted approx 5 days    Thyroid disease    hypothyroidism   [2]   Current Outpatient Medications   Medication Sig Dispense Refill    Tirzepatide-Weight Management (ZEPBOUND) 15 MG/0.5ML Subcutaneous Solution Auto-injector Inject 15 mg into the skin once a week. 6 mL 1    sertraline 50 MG Oral Tab Take 1 tablet (50 mg total) by mouth Noon. 90 tablet 3    lisinopril 20 MG Oral Tab Take 1 tablet (20 mg total) by mouth daily. 90 tablet 3    levothyroxine 88 MCG Oral Tab Take 1 tablet (88 mcg total) by mouth before breakfast. 90 tablet 3    Cholecalciferol 125 MCG (5000 UT) Oral Tab Take 1 tablet (5,000 Units total) by mouth daily.      ondansetron (ZOFRAN) 4 mg tablet Take 1 tablet (4 mg total) by mouth every 8 (eight) hours as needed for Nausea. 30 tablet 1    Esomeprazole Magnesium 20 MG Oral Capsule Delayed Release Take 1  capsule (20 mg total) by mouth before breakfast.

## 2025-08-20 RX ORDER — LEVOTHYROXINE SODIUM 88 UG/1
88 TABLET ORAL
Qty: 90 TABLET | Refills: 3 | Status: SHIPPED | OUTPATIENT
Start: 2025-08-20

## (undated) DIAGNOSIS — E03.9 HYPOTHYROIDISM, UNSPECIFIED TYPE: Primary | ICD-10-CM

## (undated) DEVICE — SOL  .9 3000ML

## (undated) DEVICE — COVER SGL STRL LGHT HNDL BLU

## (undated) DEVICE — SUCTION CANISTER, 3000CC,SAFELINER: Brand: DEROYAL

## (undated) DEVICE — STIRRUP STRAP W/SLING RING

## (undated) DEVICE — STERILE LATEX POWDER-FREE SURGICAL GLOVESWITH NITRILE COATING: Brand: PROTEXIS

## (undated) DEVICE — HYSTEROSCOPY: Brand: MEDLINE INDUSTRIES, INC.

## (undated) DEVICE — WOLF INFLOW HYSTER

## (undated) NOTE — MR AVS SNAPSHOT
After Visit Summary   1/22/2020    Lucio Lopez    MRN: [id number]           Visit Information     Date & Time  1/22/2020  8:00 AM Provider  Isabela Michelle MD [address] [facility] Dept.  Phone  [phone] conditions such as allergies, colds, cough, fever, rash, sore throat, headache and pink eye. The cost for a Video Visit is currently $35.         If you receive a survey from [employer], please take a few minutes to complete it and provide feedback.  [facility] Emergency Medicine Providers  Conditions needing urgent attention, but are   non-life-threatening.     Also available by appointment     Average cost  $120*     EMERGENCY ROOM         Life-threatening emergencies needing immediate intervention at a hospital

## (undated) NOTE — LETTER
[address] [facility]  [id number]    Authorization for Imaging Procedure       I hereby authorize Dr. Aisha Pate , my physician and his/her assistants (if applicable), which may include medical students, residents, and/or fellows, to perform the following procedure and administer such anesthesia as may be determined necessary by my physician: STEREOTACTIC GUIDED WITH TOMOSYNTHESIS BIOPSY OF THE RIGHT BREAST WITH CLIP PLACEMENT  on [facility]. 2.  I recognize that during the procedure, unforeseen conditions may necessitate additional or different procedures than those listed above. I, therefore, further authorize and request that the above-named physician, assistants, or designees perform such procedures as are, in their judgment, necessary and desirable. 3.  My physician has discussed prior to my procedure the potential benefits, risks and side effects of this procedure; the likelihood of achieving goals; and potential problems that might occur during recuperation. They also discussed reasonable alternatives to the procedure, including risks, benefits, and side effects related to the alternatives and risks related to not receiving this procedure. I have had all my questions answered and I acknowledge that no guarantee has been made as to the result that may be obtained. 4.  Should the need arise during my procedure, which includes change of level of care prior to discharge, I also consent to the administration of blood and/or blood products. Further, I understand that despite careful testing and screening of blood or blood products by collecting agencies, I may still be subject to ill effects as a result of receiving a blood transfusion and/or blood products.  The following are some, but not all, of the potential risks that can occur: fever and allergic reactions, hemolytic reactions, transmission of diseases such as Hepatitis, AIDS and Cytomegalovirus (CMV) and fluid overload. In the event that I wish to have an autologous transfusion of my own blood, or a directed donor transfusion, I will discuss this with my physician. Check only if Refusing Blood or Blood Products  I understand refusal of blood or blood products as deemed necessary by my physician may have serious consequences to my condition to include possible death. I hereby assume responsibility for my refusal and release the hospital, its personnel, and my physicians from any responsibility for the consequences of my refusal.   [  ] Patient Refuses Blood      5. I authorize the use of any specimen, organs, tissues, body parts or foreign objects that may be removed from my body during the procedure for diagnosis, research or teaching purposes and their subsequent disposal by hospital authorities. I also authorize the release of specimen test results and/or written reports to my treating physician on the hospital medical staff or other referring or consulting physicians involved in my care, at the discretion of the Pathologist or my treating physician. 6.  I consent to the photographing or videotaping of the procedures to be performed, including appropriate portions of my body for medical, scientific, or educational purposes, provided my identity is not revealed by the pictures or by descriptive texts accompanying them. If the procedure has been photographed/videotaped, the physician will obtain the original picture, image, videotape or CD. The hospital will not be responsible for storage, release or maintenance of the picture, image, tape or CD.   7.  I consent to the presence of a  or observers in the operating room as deemed necessary by my physician or their designees. 8.  I recognize that in the event my procedure results in extended X-Ray/fluoroscopy time, I may develop a skin reaction. 9.   If I have a Do Not Attempt Resuscitation (DNAR) order in place, that status will be suspended while in the operating room, procedural suite, and during the recovery period unless otherwise explicitly stated by me (or a person authorized to consent on my behalf). The performing physician or my attending physician will determine when the applicable recovery period ends for purposes of reinstating the DNAR order. 10.  I acknowledge that my physician has explained sedation/analgesia administration to me including the risk and benefits I consent to the administration of sedation/analgesia as may be necessary or desirable in the judgment of my physician. I CERTIFY THAT I HAVE READ AND FULLY UNDERSTAND THE ABOVE CONSENT FOR THE PROCEDURE. Signature of Patient: _____________________________________________________________  Responsible person in case of minor, unconscious: ____________________________________  Relationship to patient:  __________________________________________________________  Signature of Witness: _______________________________Date: _________Time: __________    Statement of Physician: My signature below affirms that prior to the time of the procedure, I have explained to the patient and/or her guardian, the risks and benefits involved in the proposed treatment and any reasonable alternative to the proposed treatment. I have also explained the risks and benefits involved in the refusal of the proposed treatment and have answered the patient's questions. If I have a significant financial interest in a co-management agreement or a significant financial interest in any product or implant, or other significant relationship used in the procedure/surgery, I have disclosed this and had a discussion with my patient.   Signature of Physician:   _________________________________Date:_____________Time:________    Patient Name: Tctavo More : 1960  Printed: November 10, 2023   Medical Record #: [id number]

## (undated) NOTE — LETTER
Kate Lott, :1960    CONSENT FOR PROCEDURE/SEDATION    1. I authorize the performance upon Kate Lott  the following: Endometrial biopsy procedure    2.  I authorize Dr. Guanaco Carroll MD (and whomever is designated as the doctor’s assistant Witness: _________________________________________ Date:___________     Physician Signature: _______________________________ Date:___________